# Patient Record
Sex: MALE | Race: WHITE | Employment: OTHER | URBAN - METROPOLITAN AREA
[De-identification: names, ages, dates, MRNs, and addresses within clinical notes are randomized per-mention and may not be internally consistent; named-entity substitution may affect disease eponyms.]

---

## 2024-03-19 ENCOUNTER — APPOINTMENT (OUTPATIENT)
Dept: GENERAL RADIOLOGY | Age: 69
End: 2024-03-19
Payer: MEDICARE

## 2024-03-19 ENCOUNTER — APPOINTMENT (OUTPATIENT)
Dept: CT IMAGING | Age: 69
End: 2024-03-19
Payer: MEDICARE

## 2024-03-19 ENCOUNTER — HOSPITAL ENCOUNTER (EMERGENCY)
Age: 69
Discharge: ANOTHER HOSPITAL | End: 2024-03-19
Attending: EMERGENCY MEDICINE
Payer: MEDICARE

## 2024-03-19 VITALS
TEMPERATURE: 97.8 F | OXYGEN SATURATION: 93 % | BODY MASS INDEX: 29.82 KG/M2 | WEIGHT: 225 LBS | SYSTOLIC BLOOD PRESSURE: 109 MMHG | HEART RATE: 119 BPM | HEIGHT: 73 IN | DIASTOLIC BLOOD PRESSURE: 88 MMHG | RESPIRATION RATE: 21 BRPM

## 2024-03-19 DIAGNOSIS — I26.02 ACUTE SADDLE PULMONARY EMBOLISM WITH ACUTE COR PULMONALE (HCC): ICD-10-CM

## 2024-03-19 DIAGNOSIS — T07.XXXA MULTIPLE ABRASIONS: ICD-10-CM

## 2024-03-19 DIAGNOSIS — S01.81XA FACIAL LACERATION, INITIAL ENCOUNTER: ICD-10-CM

## 2024-03-19 DIAGNOSIS — S12.301A CLOSED NONDISPLACED FRACTURE OF FOURTH CERVICAL VERTEBRA, UNSPECIFIED FRACTURE MORPHOLOGY, INITIAL ENCOUNTER (HCC): ICD-10-CM

## 2024-03-19 DIAGNOSIS — S02.2XXA CLOSED FRACTURE OF NASAL BONE, INITIAL ENCOUNTER: Primary | ICD-10-CM

## 2024-03-19 LAB
ALBUMIN SERPL-MCNC: 3.4 G/DL (ref 3.5–5.2)
ALP SERPL-CCNC: 115 U/L (ref 40–129)
ALT SERPL-CCNC: 54 U/L (ref 0–40)
ANION GAP SERPL CALCULATED.3IONS-SCNC: 14 MMOL/L (ref 7–16)
AST SERPL-CCNC: 41 U/L (ref 0–39)
BASOPHILS # BLD: 0.05 K/UL (ref 0–0.2)
BASOPHILS NFR BLD: 0 % (ref 0–2)
BILIRUB SERPL-MCNC: 1 MG/DL (ref 0–1.2)
BNP SERPL-MCNC: 105 PG/ML (ref 0–450)
BUN SERPL-MCNC: 22 MG/DL (ref 6–23)
CALCIUM SERPL-MCNC: 8.9 MG/DL (ref 8.6–10.2)
CHLORIDE SERPL-SCNC: 103 MMOL/L (ref 98–107)
CO2 SERPL-SCNC: 21 MMOL/L (ref 22–29)
CREAT SERPL-MCNC: 1.2 MG/DL (ref 0.7–1.2)
D DIMER: >5250 NG/ML DDU (ref 0–232)
EOSINOPHIL # BLD: 0.08 K/UL (ref 0.05–0.5)
EOSINOPHILS RELATIVE PERCENT: 1 % (ref 0–6)
ERYTHROCYTE [DISTWIDTH] IN BLOOD BY AUTOMATED COUNT: 13.3 % (ref 11.5–15)
GFR SERPL CREATININE-BSD FRML MDRD: >60 ML/MIN/1.73M2
GLUCOSE BLD-MCNC: 120 MG/DL (ref 74–99)
GLUCOSE SERPL-MCNC: 142 MG/DL (ref 74–99)
HCT VFR BLD AUTO: 41.6 % (ref 37–54)
HGB BLD-MCNC: 13.6 G/DL (ref 12.5–16.5)
IMM GRANULOCYTES # BLD AUTO: 0.07 K/UL (ref 0–0.58)
IMM GRANULOCYTES NFR BLD: 1 % (ref 0–5)
INFLUENZA A BY PCR: NOT DETECTED
INFLUENZA B BY PCR: NOT DETECTED
LYMPHOCYTES NFR BLD: 2.09 K/UL (ref 1.5–4)
LYMPHOCYTES RELATIVE PERCENT: 17 % (ref 20–42)
MCH RBC QN AUTO: 31.1 PG (ref 26–35)
MCHC RBC AUTO-ENTMCNC: 32.7 G/DL (ref 32–34.5)
MCV RBC AUTO: 95.2 FL (ref 80–99.9)
MONOCYTES NFR BLD: 1.34 K/UL (ref 0.1–0.95)
MONOCYTES NFR BLD: 11 % (ref 2–12)
NEUTROPHILS NFR BLD: 70 % (ref 43–80)
NEUTS SEG NFR BLD: 8.52 K/UL (ref 1.8–7.3)
PARTIAL THROMBOPLASTIN TIME: 30.7 SEC (ref 24.5–35.1)
PLATELET # BLD AUTO: 279 K/UL (ref 130–450)
PMV BLD AUTO: 9.2 FL (ref 7–12)
POTASSIUM SERPL-SCNC: 4.4 MMOL/L (ref 3.5–5)
PROT SERPL-MCNC: 7.4 G/DL (ref 6.4–8.3)
RBC # BLD AUTO: 4.37 M/UL (ref 3.8–5.8)
SARS-COV-2 RDRP RESP QL NAA+PROBE: NOT DETECTED
SODIUM SERPL-SCNC: 138 MMOL/L (ref 132–146)
SPECIMEN DESCRIPTION: NORMAL
TROPONIN I SERPL HS-MCNC: 157 NG/L (ref 0–11)
TROPONIN I SERPL HS-MCNC: 214 NG/L (ref 0–11)
TROPONIN I SERPL HS-MCNC: 42 NG/L (ref 0–11)
WBC OTHER # BLD: 12.2 K/UL (ref 4.5–11.5)

## 2024-03-19 PROCEDURE — 96365 THER/PROPH/DIAG IV INF INIT: CPT

## 2024-03-19 PROCEDURE — 87502 INFLUENZA DNA AMP PROBE: CPT

## 2024-03-19 PROCEDURE — 83880 ASSAY OF NATRIURETIC PEPTIDE: CPT

## 2024-03-19 PROCEDURE — 99285 EMERGENCY DEPT VISIT HI MDM: CPT

## 2024-03-19 PROCEDURE — 96366 THER/PROPH/DIAG IV INF ADDON: CPT

## 2024-03-19 PROCEDURE — 70450 CT HEAD/BRAIN W/O DYE: CPT

## 2024-03-19 PROCEDURE — 71275 CT ANGIOGRAPHY CHEST: CPT

## 2024-03-19 PROCEDURE — 84484 ASSAY OF TROPONIN QUANT: CPT

## 2024-03-19 PROCEDURE — 85379 FIBRIN DEGRADATION QUANT: CPT

## 2024-03-19 PROCEDURE — 96375 TX/PRO/DX INJ NEW DRUG ADDON: CPT

## 2024-03-19 PROCEDURE — 6360000004 HC RX CONTRAST MEDICATION: Performed by: RADIOLOGY

## 2024-03-19 PROCEDURE — 80053 COMPREHEN METABOLIC PANEL: CPT

## 2024-03-19 PROCEDURE — 85730 THROMBOPLASTIN TIME PARTIAL: CPT

## 2024-03-19 PROCEDURE — 71045 X-RAY EXAM CHEST 1 VIEW: CPT

## 2024-03-19 PROCEDURE — 2580000003 HC RX 258: Performed by: EMERGENCY MEDICINE

## 2024-03-19 PROCEDURE — 93005 ELECTROCARDIOGRAM TRACING: CPT | Performed by: EMERGENCY MEDICINE

## 2024-03-19 PROCEDURE — 96361 HYDRATE IV INFUSION ADD-ON: CPT

## 2024-03-19 PROCEDURE — 6360000002 HC RX W HCPCS: Performed by: EMERGENCY MEDICINE

## 2024-03-19 PROCEDURE — 12011 RPR F/E/E/N/L/M 2.5 CM/<: CPT

## 2024-03-19 PROCEDURE — 85025 COMPLETE CBC W/AUTO DIFF WBC: CPT

## 2024-03-19 PROCEDURE — 87635 SARS-COV-2 COVID-19 AMP PRB: CPT

## 2024-03-19 PROCEDURE — 70486 CT MAXILLOFACIAL W/O DYE: CPT

## 2024-03-19 PROCEDURE — 72125 CT NECK SPINE W/O DYE: CPT

## 2024-03-19 PROCEDURE — 82962 GLUCOSE BLOOD TEST: CPT

## 2024-03-19 RX ORDER — HEPARIN SODIUM 1000 [USP'U]/ML
80 INJECTION, SOLUTION INTRAVENOUS; SUBCUTANEOUS PRN
Status: DISCONTINUED | OUTPATIENT
Start: 2024-03-19 | End: 2024-03-19 | Stop reason: HOSPADM

## 2024-03-19 RX ORDER — 0.9 % SODIUM CHLORIDE 0.9 %
1000 INTRAVENOUS SOLUTION INTRAVENOUS ONCE
Status: COMPLETED | OUTPATIENT
Start: 2024-03-19 | End: 2024-03-19

## 2024-03-19 RX ORDER — HEPARIN SODIUM 10000 [USP'U]/100ML
5-30 INJECTION, SOLUTION INTRAVENOUS CONTINUOUS
Status: DISCONTINUED | OUTPATIENT
Start: 2024-03-19 | End: 2024-03-19 | Stop reason: HOSPADM

## 2024-03-19 RX ORDER — HEPARIN SODIUM 1000 [USP'U]/ML
80 INJECTION, SOLUTION INTRAVENOUS; SUBCUTANEOUS ONCE
Status: COMPLETED | OUTPATIENT
Start: 2024-03-19 | End: 2024-03-19

## 2024-03-19 RX ORDER — HEPARIN SODIUM 1000 [USP'U]/ML
40 INJECTION, SOLUTION INTRAVENOUS; SUBCUTANEOUS PRN
Status: DISCONTINUED | OUTPATIENT
Start: 2024-03-19 | End: 2024-03-19 | Stop reason: HOSPADM

## 2024-03-19 RX ADMIN — SODIUM CHLORIDE 1000 ML: 9 INJECTION, SOLUTION INTRAVENOUS at 11:34

## 2024-03-19 RX ADMIN — CEFAZOLIN 2000 MG: 2 INJECTION, POWDER, FOR SOLUTION INTRAMUSCULAR; INTRAVENOUS at 14:36

## 2024-03-19 RX ADMIN — HEPARIN SODIUM 8170 UNITS: 1000 INJECTION INTRAVENOUS; SUBCUTANEOUS at 13:42

## 2024-03-19 RX ADMIN — HEPARIN SODIUM AND DEXTROSE 18 UNITS/KG/HR: 10000; 5 INJECTION INTRAVENOUS at 13:45

## 2024-03-19 RX ADMIN — IOPAMIDOL 70 ML: 755 INJECTION, SOLUTION INTRAVENOUS at 12:38

## 2024-03-19 ASSESSMENT — ENCOUNTER SYMPTOMS
SHORTNESS OF BREATH: 0
EYE REDNESS: 0
VOMITING: 0
ABDOMINAL PAIN: 0
NAUSEA: 0

## 2024-03-19 NOTE — ED PROVIDER NOTES
[facility] EMERGENCY DEPARTMENT  EMERGENCY DEPARTMENT ENCOUNTER        Pt Name: Noel Guerrero  MRN: [id number]  Birthdate 1955  Date of evaluation: 3/19/2024  Provider: Angelique Biggs DO  PCP: No primary care provider on file.  Note Started: 11:03 AM EDT 3/19/24    CHIEF COMPLAINT       Chief Complaint   Patient presents with    Fall     Fell face first, +loc, lac on forehead and nose - thinners     Dizziness       HISTORY OF PRESENT ILLNESS: 1 or more Elements       Noel Guerrero is a 68 y.o. male who presents to the emergency department the chief complaint of fall.  The history is obtained from the patient as well as the patient's medical record.  The patient is presenting for a fall.  The patient states that over the last week he has had viral symptoms.  He has been treating these symptomatically with over-the-counter medications.  He states that he had improvement in his symptoms this morning when out to walk the dog.  He states he had sudden onset of lightheadedness causing him to fall and strike his face.  He is not on any blood thinners.  He states he is up-to-date on his tetanus immunization.  He does have pain with sleep located in his head.  He denies any numbness, weakness or paresthesias.  He denies any fevers, chills, chest pain shortness of breath, nausea or vomiting.    Nursing Notes were all reviewed and agreed with or any disagreements were addressed in the HPI.    REVIEW OF SYSTEMS :      Review of Systems   Constitutional:  Positive for fatigue. Negative for fever.   HENT:  Negative for congestion.    Eyes:  Negative for redness.   Respiratory:  Negative for shortness of breath.    Cardiovascular:  Negative for chest pain.   Gastrointestinal:  Negative for abdominal pain, nausea and vomiting.   Genitourinary:  Negative for dysuria.   Musculoskeletal:  Negative for arthralgias.   Skin:  Positive for wound. Negative for rash.   Neurological:  Positive for dizziness

## 2024-03-19 NOTE — CONSULTS
PULMONARY CRITICAL CARE CONSULTATION NOTE.    3/19/2024    CONSULTING  PHYSICIAN: Dr. Biggs    REASON FOR REFERRAL: Pulmonary embolism    History of Present Illness    Mr. Guerrero  is a 68 y.o. never smoker  male with no prior family history of blood clots.  He presented to [facility] emergency room after he sustained a fall and injured right side of his face including nose and forehead.  It was a syncopal episode and patient lost recollection of some of the activities starting just before the fall.  He gained consciousness while in to the hospital.  He was found to have a large saddle and bilateral pulmonary embolism with right heart strain.  CT scan of the chest was personally reviewed by me.  There is a possible right lower lobe pulmonary infarct as well.  Patient came back from [place] at the end of January and a 12-hour long flight.  He has been sick with flulike symptoms for the last week or so.  He has been tested twice for COVID over the last 10 days and has been negative both times.    Presenting symptoms-    Gradually worsening ARIAS and syncope. No acute symptoms of MI such as chest pain.     Baseline Exercise tolerance/MMRC score( Bold )     MMRC Dyspnea Scale  Grade Description of Breathlessness   0 I only get breathless with strenuous exercise.   1 I get short of breath when hurrying on level ground or walking up a slight hill.   2 On level ground, I walk slower than people of the same age because of breathlessness, or have to stop for breath when walking at my own pace.   3 I stop for breath after walking about 100 yards or after a few minutes on level ground.   4 I am too breathless to leave the house or I am breathless when dressing.     Smoking history- Never smoker    Occupational exposure/ Pet/bird -  No history of exposure to any occupational Pneumotoxins.     Age appropriate Cancer screening-   Patient is up to date on age appropriate cancer screening and immunizations.

## 2024-03-20 LAB
EKG ATRIAL RATE: 112 BPM
EKG ATRIAL RATE: 115 BPM
EKG P AXIS: 34 DEGREES
EKG P AXIS: 74 DEGREES
EKG P-R INTERVAL: 140 MS
EKG P-R INTERVAL: 144 MS
EKG Q-T INTERVAL: 324 MS
EKG Q-T INTERVAL: 362 MS
EKG QRS DURATION: 126 MS
EKG QRS DURATION: 92 MS
EKG QTC CALCULATION (BAZETT): 448 MS
EKG QTC CALCULATION (BAZETT): 494 MS
EKG R AXIS: 20 DEGREES
EKG R AXIS: 9 DEGREES
EKG T AXIS: -23 DEGREES
EKG T AXIS: -28 DEGREES
EKG VENTRICULAR RATE: 112 BPM
EKG VENTRICULAR RATE: 115 BPM

## 2024-03-20 PROCEDURE — 93010 ELECTROCARDIOGRAM REPORT: CPT | Performed by: INTERNAL MEDICINE

## 2024-03-27 ENCOUNTER — APPOINTMENT (OUTPATIENT)
Dept: GENERAL RADIOLOGY | Age: 69
End: 2024-03-27
Payer: MEDICARE

## 2024-03-27 ENCOUNTER — HOSPITAL ENCOUNTER (EMERGENCY)
Age: 69
Discharge: ANOTHER HOSPITAL | End: 2024-03-27
Attending: STUDENT IN AN ORGANIZED HEALTH CARE EDUCATION/TRAINING PROGRAM
Payer: MEDICARE

## 2024-03-27 ENCOUNTER — APPOINTMENT (OUTPATIENT)
Dept: CT IMAGING | Age: 69
End: 2024-03-27
Payer: MEDICARE

## 2024-03-27 VITALS
DIASTOLIC BLOOD PRESSURE: 79 MMHG | OXYGEN SATURATION: 99 % | WEIGHT: 224.87 LBS | TEMPERATURE: 98.1 F | BODY MASS INDEX: 29.67 KG/M2 | HEART RATE: 92 BPM | SYSTOLIC BLOOD PRESSURE: 108 MMHG | RESPIRATION RATE: 18 BRPM

## 2024-03-27 DIAGNOSIS — A41.9 SEPTIC SHOCK (HCC): Primary | ICD-10-CM

## 2024-03-27 DIAGNOSIS — R65.21 SEPTIC SHOCK (HCC): Primary | ICD-10-CM

## 2024-03-27 DIAGNOSIS — D72.829 LEUKOCYTOSIS, UNSPECIFIED TYPE: ICD-10-CM

## 2024-03-27 DIAGNOSIS — I26.92 ACUTE SADDLE PULMONARY EMBOLISM, UNSPECIFIED WHETHER ACUTE COR PULMONALE PRESENT (HCC): ICD-10-CM

## 2024-03-27 DIAGNOSIS — A41.9 SEPTICEMIA (HCC): ICD-10-CM

## 2024-03-27 DIAGNOSIS — E87.20 LACTIC ACIDOSIS: ICD-10-CM

## 2024-03-27 LAB
ALBUMIN SERPL-MCNC: 4.2 G/DL (ref 3.5–5.2)
ALP SERPL-CCNC: 163 U/L (ref 40–129)
ALT SERPL-CCNC: 104 U/L (ref 0–40)
ANION GAP SERPL CALCULATED.3IONS-SCNC: 25 MMOL/L (ref 7–16)
AST SERPL-CCNC: 54 U/L (ref 0–39)
B.E.: -17.3 MMOL/L (ref -3–3)
BACTERIA URNS QL MICRO: ABNORMAL
BASOPHILS # BLD: 0.07 K/UL (ref 0–0.2)
BASOPHILS NFR BLD: 0 % (ref 0–2)
BILIRUB SERPL-MCNC: 0.5 MG/DL (ref 0–1.2)
BILIRUB UR QL STRIP: ABNORMAL
BNP SERPL-MCNC: 352 PG/ML (ref 0–450)
BUN SERPL-MCNC: 39 MG/DL (ref 6–23)
CALCIUM SERPL-MCNC: 9.4 MG/DL (ref 8.6–10.2)
CASTS #/AREA URNS LPF: ABNORMAL /LPF
CASTS #/AREA URNS LPF: ABNORMAL /LPF
CHLORIDE SERPL-SCNC: 98 MMOL/L (ref 98–107)
CLARITY UR: ABNORMAL
CO2 SERPL-SCNC: 14 MMOL/L (ref 22–29)
COHB: 0.3 % (ref 0–1.5)
COLOR UR: ABNORMAL
CREAT SERPL-MCNC: 1.8 MG/DL (ref 0.7–1.2)
CRITICAL: ABNORMAL
DATE ANALYZED: ABNORMAL
DATE OF COLLECTION: ABNORMAL
EOSINOPHIL # BLD: 0.03 K/UL (ref 0.05–0.5)
EOSINOPHILS RELATIVE PERCENT: 0 % (ref 0–6)
ERYTHROCYTE [DISTWIDTH] IN BLOOD BY AUTOMATED COUNT: 13.3 % (ref 11.5–15)
GFR SERPL CREATININE-BSD FRML MDRD: 41 ML/MIN/1.73M2
GLUCOSE SERPL-MCNC: 292 MG/DL (ref 74–99)
GLUCOSE UR STRIP-MCNC: >=1000 MG/DL
HCO3: 7.6 MMOL/L (ref 22–26)
HCT VFR BLD AUTO: 44.3 % (ref 37–54)
HGB BLD-MCNC: 14.2 G/DL (ref 12.5–16.5)
HGB UR QL STRIP.AUTO: ABNORMAL
HHB: 1.1 % (ref 0–5)
IMM GRANULOCYTES # BLD AUTO: 0.4 K/UL (ref 0–0.58)
IMM GRANULOCYTES NFR BLD: 2 % (ref 0–5)
INFLUENZA A BY PCR: NOT DETECTED
INFLUENZA B BY PCR: NOT DETECTED
INR PPP: 1.1
KETONES UR STRIP-MCNC: 40 MG/DL
LAB: ABNORMAL
LACTATE BLDV-SCNC: 7.4 MMOL/L (ref 0.5–1.9)
LACTATE BLDV-SCNC: 8.8 MMOL/L (ref 0.5–1.9)
LEUKOCYTE ESTERASE UR QL STRIP: ABNORMAL
LIPASE SERPL-CCNC: 51 U/L (ref 13–60)
LYMPHOCYTES NFR BLD: 2.78 K/UL (ref 1.5–4)
LYMPHOCYTES RELATIVE PERCENT: 14 % (ref 20–42)
Lab: 350
MCH RBC QN AUTO: 31.1 PG (ref 26–35)
MCHC RBC AUTO-ENTMCNC: 32.1 G/DL (ref 32–34.5)
MCV RBC AUTO: 97.1 FL (ref 80–99.9)
METHB: 0.3 % (ref 0–1.5)
MODE: ABNORMAL
MONOCYTES NFR BLD: 0.84 K/UL (ref 0.1–0.95)
MONOCYTES NFR BLD: 4 % (ref 2–12)
NEUTROPHILS NFR BLD: 79 % (ref 43–80)
NEUTS SEG NFR BLD: 15.66 K/UL (ref 1.8–7.3)
NITRITE UR QL STRIP: POSITIVE
O2 CONTENT: 17.9 ML/DL
O2 SATURATION: 98.9 % (ref 92–98.5)
O2HB: 98.3 % (ref 94–97)
OPERATOR ID: ABNORMAL
PARTIAL THROMBOPLASTIN TIME: 25.4 SEC (ref 24.5–35.1)
PATIENT TEMP: 37 C
PCO2: 17.9 MMHG (ref 35–45)
PH BLOOD GAS: 7.25 (ref 7.35–7.45)
PH UR STRIP: 6 [PH] (ref 5–9)
PLATELET # BLD AUTO: 598 K/UL (ref 130–450)
PMV BLD AUTO: 8.8 FL (ref 7–12)
PO2: 145.2 MMHG (ref 75–100)
POTASSIUM SERPL-SCNC: 3.8 MMOL/L (ref 3.5–5)
PROT SERPL-MCNC: 7.8 G/DL (ref 6.4–8.3)
PROT UR STRIP-MCNC: 100 MG/DL
PROTHROMBIN TIME: 12.4 SEC (ref 9.3–12.4)
RBC # BLD AUTO: 4.56 M/UL (ref 3.8–5.8)
RBC #/AREA URNS HPF: ABNORMAL /HPF
SARS-COV-2 RDRP RESP QL NAA+PROBE: NOT DETECTED
SODIUM SERPL-SCNC: 137 MMOL/L (ref 132–146)
SOURCE, BLOOD GAS: ABNORMAL
SP GR UR STRIP: 1.02 (ref 1–1.03)
SPECIMEN DESCRIPTION: NORMAL
THB: 12.8 G/DL (ref 11.5–16.5)
TIME ANALYZED: 359
TROPONIN I SERPL HS-MCNC: 21 NG/L (ref 0–11)
TROPONIN I SERPL HS-MCNC: 31 NG/L (ref 0–11)
TROPONIN I SERPL HS-MCNC: 44 NG/L (ref 0–11)
UROBILINOGEN UR STRIP-ACNC: 0.2 EU/DL (ref 0–1)
WBC #/AREA URNS HPF: ABNORMAL /HPF
WBC OTHER # BLD: 19.8 K/UL (ref 4.5–11.5)

## 2024-03-27 PROCEDURE — 6360000002 HC RX W HCPCS: Performed by: STUDENT IN AN ORGANIZED HEALTH CARE EDUCATION/TRAINING PROGRAM

## 2024-03-27 PROCEDURE — 93005 ELECTROCARDIOGRAM TRACING: CPT | Performed by: STUDENT IN AN ORGANIZED HEALTH CARE EDUCATION/TRAINING PROGRAM

## 2024-03-27 PROCEDURE — 85025 COMPLETE CBC W/AUTO DIFF WBC: CPT

## 2024-03-27 PROCEDURE — 84484 ASSAY OF TROPONIN QUANT: CPT

## 2024-03-27 PROCEDURE — 71045 X-RAY EXAM CHEST 1 VIEW: CPT

## 2024-03-27 PROCEDURE — 83605 ASSAY OF LACTIC ACID: CPT

## 2024-03-27 PROCEDURE — 85730 THROMBOPLASTIN TIME PARTIAL: CPT

## 2024-03-27 PROCEDURE — 80053 COMPREHEN METABOLIC PANEL: CPT

## 2024-03-27 PROCEDURE — 2580000003 HC RX 258: Performed by: STUDENT IN AN ORGANIZED HEALTH CARE EDUCATION/TRAINING PROGRAM

## 2024-03-27 PROCEDURE — 82805 BLOOD GASES W/O2 SATURATION: CPT

## 2024-03-27 PROCEDURE — 99285 EMERGENCY DEPT VISIT HI MDM: CPT

## 2024-03-27 PROCEDURE — 83880 ASSAY OF NATRIURETIC PEPTIDE: CPT

## 2024-03-27 PROCEDURE — 96361 HYDRATE IV INFUSION ADD-ON: CPT

## 2024-03-27 PROCEDURE — 83690 ASSAY OF LIPASE: CPT

## 2024-03-27 PROCEDURE — 74174 CTA ABD&PLVS W/CONTRAST: CPT

## 2024-03-27 PROCEDURE — 87635 SARS-COV-2 COVID-19 AMP PRB: CPT

## 2024-03-27 PROCEDURE — 81001 URINALYSIS AUTO W/SCOPE: CPT

## 2024-03-27 PROCEDURE — 96366 THER/PROPH/DIAG IV INF ADDON: CPT

## 2024-03-27 PROCEDURE — 87502 INFLUENZA DNA AMP PROBE: CPT

## 2024-03-27 PROCEDURE — 87040 BLOOD CULTURE FOR BACTERIA: CPT

## 2024-03-27 PROCEDURE — 71275 CT ANGIOGRAPHY CHEST: CPT

## 2024-03-27 PROCEDURE — 96375 TX/PRO/DX INJ NEW DRUG ADDON: CPT

## 2024-03-27 PROCEDURE — 96365 THER/PROPH/DIAG IV INF INIT: CPT

## 2024-03-27 PROCEDURE — 36415 COLL VENOUS BLD VENIPUNCTURE: CPT

## 2024-03-27 PROCEDURE — 6360000004 HC RX CONTRAST MEDICATION: Performed by: RADIOLOGY

## 2024-03-27 PROCEDURE — 85610 PROTHROMBIN TIME: CPT

## 2024-03-27 RX ORDER — 0.9 % SODIUM CHLORIDE 0.9 %
1000 INTRAVENOUS SOLUTION INTRAVENOUS ONCE
Status: COMPLETED | OUTPATIENT
Start: 2024-03-27 | End: 2024-03-27

## 2024-03-27 RX ORDER — SODIUM CHLORIDE 9 MG/ML
INJECTION, SOLUTION INTRAVENOUS CONTINUOUS
Status: DISCONTINUED | OUTPATIENT
Start: 2024-03-27 | End: 2024-03-27 | Stop reason: HOSPADM

## 2024-03-27 RX ADMIN — PIPERACILLIN AND TAZOBACTAM 4500 MG: 4; .5 INJECTION, POWDER, FOR SOLUTION INTRAVENOUS at 04:37

## 2024-03-27 RX ADMIN — HYDROCORTISONE SODIUM SUCCINATE 100 MG: 100 INJECTION, POWDER, FOR SOLUTION INTRAMUSCULAR; INTRAVENOUS at 04:25

## 2024-03-27 RX ADMIN — SODIUM CHLORIDE: 9 INJECTION, SOLUTION INTRAVENOUS at 04:37

## 2024-03-27 RX ADMIN — IOPAMIDOL 80 ML: 755 INJECTION, SOLUTION INTRAVENOUS at 03:29

## 2024-03-27 RX ADMIN — VANCOMYCIN HYDROCHLORIDE 2000 MG: 10 INJECTION, POWDER, LYOPHILIZED, FOR SOLUTION INTRAVENOUS at 05:13

## 2024-03-27 RX ADMIN — SODIUM CHLORIDE 1000 ML: 9 INJECTION, SOLUTION INTRAVENOUS at 03:43

## 2024-03-27 RX ADMIN — SODIUM CHLORIDE 1000 ML: 9 INJECTION, SOLUTION INTRAVENOUS at 02:40

## 2024-03-27 ASSESSMENT — LIFESTYLE VARIABLES
HOW MANY STANDARD DRINKS CONTAINING ALCOHOL DO YOU HAVE ON A TYPICAL DAY: PATIENT DOES NOT DRINK
HOW OFTEN DO YOU HAVE A DRINK CONTAINING ALCOHOL: NEVER

## 2024-03-27 NOTE — DISCHARGE INSTR - COC
Continuity of Care Form    Patient Name: Noel Guerrero   :  1955  MRN:  [id number]    Admit date:  3/27/2024  Discharge date:  ***    Code Status Order: No Order   Advance Directives:     Admitting Physician:  No admitting provider for patient encounter.  PCP: Cosme Marquez DO    Discharging Nurse: ***  Discharging Hospital Unit/Room#: GABO/GABO  Discharging Unit Phone Number: ***    Emergency Contact:   Extended Emergency Contact Information  Primary Emergency Contact: MADY SANTOS  Mobile Phone: [phone]  Relation: Spouse   needed? No    Past Surgical History:  History reviewed. No pertinent surgical history.    Immunization History:   Immunization History   Administered Date(s) Administered    COVID-19, MODERNA, ( formula), (age 12y+), IM, 50mcg/0.5mL 2023       Active Problems:  There is no problem list on file for this patient.      Isolation/Infection:   Isolation            No Isolation          Patient Infection Status       None to display                     Nurse Assessment:  Last Vital Signs: /79   Pulse 92   Temp 98.1 °F (36.7 °C) (Bladder)   Resp 18   Wt 102 kg (224 lb 13.9 oz)   SpO2 99%   BMI 29.67 kg/m²     Last documented pain score (0-10 scale):    Last Weight:   Wt Readings from Last 1 Encounters:   24 102 kg (224 lb 13.9 oz)     Mental Status:  {IP PT MENTAL STATUS:}    IV Access:  { NIDIA IV ACCESS:804856850}    Nursing Mobility/ADLs:  Walking   {CHP DME ADLs:219366807}  Transfer  {CHP DME ADLs:178527754}  Bathing  {CHP DME ADLs:426620872}  Dressing  {CHP DME ADLs:240126926}  Toileting  {CHP DME ADLs:604898353}  Feeding  {CHP DME ADLs:099983786}  Med Admin  {CHP DME ADLs:708445354}  Med Delivery   { NIDIA MED Delivery:812203714}    Wound Care Documentation and Therapy:        Elimination:  Continence:   Bowel: {YES / NO:}  Bladder: {YES / NO:}  Urinary Catheter: {Urinary Catheter:314847475}   Colostomy/Ileostomy/Ileal Conduit:  None

## 2024-03-27 NOTE — ED PROVIDER NOTES
he is wearing a Aspen collar.  Respiratory: Lungs clear to auscultation bilaterally, no wheezes, rales, or rhonchi. Not in respiratory distress  Cardiovascular:  Regular rate. Regular rhythm. No murmurs, .   Chest: No chest wall tenderness  GI:  Abdomen Soft, Non tender, Non distended.     No rebound, guarding, or rigidity. No pulsatile masses.  Musculoskeletal: Moves all extremities x 4. Warm and well perfused, no clubbing, cyanosis, or edema. Capillary refill <3 seconds, no midline tenderness noted to the lower spine no signs of ecchymosis or erythema to the lower back.  Integument: skin warm and dry. No rashes.   Neurologic: GCS 15, no focal deficits,   Psychiatric: Normal Affect          -------------------------------------------------- RESULTS -------------------------------------------------  I have personally reviewed all laboratory and imaging results for this patient. Results are listed below.       ------------------------------ ED COURSE/MEDICAL DECISION MAKING----------------------  Medications   sodium chloride 0.9 % bolus 1,000 mL (0 mLs IntraVENous Stopped 3/27/24 0343)   iopamidol (ISOVUE-370) 76 % injection 80 mL (80 mLs IntraVENous Given 3/27/24 3319)   sodium chloride 0.9 % bolus 1,000 mL (0 mLs IntraVENous Stopped 3/27/24 0344)   hydrocortisone sodium succinate PF (SOLU-CORTEF) injection 100 mg (100 mg IntraVENous Given 3/27/24 3145)   vancomycin (VANCOCIN) 2000 mg in 0.9% sodium chloride 500 mL IVPB (0 mg IntraVENous Stopped 3/27/24 2740)   piperacillin-tazobactam (ZOSYN) 4,500 mg in sodium chloride 0.9 % 100 mL IVPB ([id number]) (0 mg IntraVENous Stopped 3/27/24 0512)            Medical Decision Making:     Diagnostic results    LABS:    Labs Reviewed   LACTATE, SEPSIS - Abnormal; Notable for the following components:       Result Value    Lactic Acid, Sepsis 8.8 (*)     All other components within normal limits   LACTATE, SEPSIS - Abnormal; Notable for the following components:    Lactic Acid,  stable intervals no acute ST elevation or depression however difficult baseline to interpret due to baseline artifact.  Due to patient's shivering.  Stable as compared to previous [CB]   0406 Initial wet read as interpreted by myself chest X-ray: No pneumothorax, trachea midline, no acute infiltrate  See radiologist for final interpretation   [CB]   0611 Dr james joiner with transfer to [facility] [CB]      ED Course User Index  [CB] Alcon Lujan MD       Is this patient to be included in the SEP-1 core measure? Yes SEP-1 CORE MEASURE DATA      Sepsis Criteria   Severe Sepsis Criteria   Septic Shock Criteria       Must meet 2:    []Temp >100.9 F (38.3 C) or < 96.8 F (36 C)  [x]HR > 90  [x]RR > 20  [x]WBC > 12 or < 4 or 10% bands    AND:    [x] Infection Confirmed or Suspected.     Must meet 1:    [x]Lactate > 2       or   [x]Signs of Organ Dysfunction:    - SBP < 90 or MAP < 65  -Creatinine > 2 or increased from baseline  -Urine Output < 0.5 ml/kg/hr  -Bilirubin > 2  -INR > 1.5 (not anticoagulated)  -Platelets < 100,000  -Acute Respiratory Failure as evidenced by new need for NIPPV or mechanical ventilation   Must meet 1:    [x]Lactate > 4        or   [x]SBP < 90 or MAP < 65 for at least two readings in the first hour after fluid bolus administration    []Vasopressors initiated (if hypotension persists after fluid resuscitation)   No data found.   Recent Labs     03/27/24  0238   WBC 19.8*   CREATININE 1.8*   BILITOT 0.5   INR 1.1   *        Septic shock    Fluid Resuscitation Rationale: at least 30mL/kg based on entered actual weight at time of triage    Repeat lactate level: improving    Reassessment Exam: I have reassessed tissue perfusion and hemodynamic status after fluid bolus at this date/time:      This patient's ED course included: a personal history and physicial examination, re-evaluation prior to disposition, multiple bedside re-evaluations, IV medications, cardiac monitoring, continuous

## 2024-03-27 NOTE — ED NOTES
Irrigated cath with 60cc of sterile saline. Pierre blood returned and no clots. Dr. Lujan notified

## 2024-03-27 NOTE — ED NOTES
Radiology Procedure Waiver   Name: Noel Guerrero  : 1955  MRN: [id number]    Date:  3/27/24    Time: 2:42 AM EDT    Benefits of immediately proceeding with radiology exam(s) without pre-testing outweigh the risks or are not indicated as specified below and therefore the following is/are being waived:    [x] Benefits of immediate radiology exam(s) outweigh any risk.                                               OR    Pre-exam testing is not indicated for the following reason(s):  [] Pregnancy test   [] Patients LMP on-time and regular.   [] Patient had Tubal Ligation or has other Contraception Device.   [] Patient  is Menopausal or Premenarcheal.    [] Patient had Full or Partial Hysterectomy.    [] Protocol for CT contrast allegry   [] Patient has tolerated well previously   [] Patient does not have a true allergy    [] MRI Questionnaire     [] BUN/Creatinine   [] Patient age w/no hx of renal dysfunction.   [] Patient on Dialysis.   [] Recent Normal Labs.  Electronically signed by Alcon Lujan MD on 3/27/24 at 2:42 AM DIANET               Alcon Lujan MD  [phone]

## 2024-03-28 LAB
EKG ATRIAL RATE: 123 BPM
EKG P AXIS: 85 DEGREES
EKG P-R INTERVAL: 134 MS
EKG Q-T INTERVAL: 344 MS
EKG QRS DURATION: 82 MS
EKG QTC CALCULATION (BAZETT): 492 MS
EKG R AXIS: 55 DEGREES
EKG T AXIS: 60 DEGREES
EKG VENTRICULAR RATE: 123 BPM

## 2024-03-28 PROCEDURE — 93010 ELECTROCARDIOGRAM REPORT: CPT | Performed by: INTERNAL MEDICINE

## 2024-03-31 LAB
MICROORGANISM SPEC CULT: NORMAL
MICROORGANISM SPEC CULT: NORMAL
SERVICE CMNT-IMP: NORMAL
SERVICE CMNT-IMP: NORMAL
SPECIMEN DESCRIPTION: NORMAL
SPECIMEN DESCRIPTION: NORMAL

## 2024-04-15 PROCEDURE — 99285 EMERGENCY DEPT VISIT HI MDM: CPT

## 2024-04-16 ENCOUNTER — HOSPITAL ENCOUNTER (INPATIENT)
Age: 69
LOS: 4 days | Discharge: NURSING, REHAB OR LONG-TERM CARE | DRG: 286 | End: 2024-04-23
Attending: EMERGENCY MEDICINE | Admitting: STUDENT IN AN ORGANIZED HEALTH CARE EDUCATION/TRAINING PROGRAM
Payer: MEDICARE

## 2024-04-16 ENCOUNTER — APPOINTMENT (OUTPATIENT)
Dept: NUCLEAR MEDICINE | Age: 69
DRG: 286 | End: 2024-04-16
Attending: NURSE PRACTITIONER
Payer: MEDICARE

## 2024-04-16 ENCOUNTER — APPOINTMENT (OUTPATIENT)
Dept: ULTRASOUND IMAGING | Age: 69
DRG: 286 | End: 2024-04-16
Payer: MEDICARE

## 2024-04-16 ENCOUNTER — APPOINTMENT (OUTPATIENT)
Dept: GENERAL RADIOLOGY | Age: 69
DRG: 286 | End: 2024-04-16
Payer: MEDICARE

## 2024-04-16 ENCOUNTER — APPOINTMENT (OUTPATIENT)
Age: 69
DRG: 286 | End: 2024-04-16
Attending: NURSE PRACTITIONER
Payer: MEDICARE

## 2024-04-16 ENCOUNTER — APPOINTMENT (OUTPATIENT)
Dept: CT IMAGING | Age: 69
DRG: 286 | End: 2024-04-16
Payer: MEDICARE

## 2024-04-16 DIAGNOSIS — R60.0 LEG EDEMA: ICD-10-CM

## 2024-04-16 DIAGNOSIS — I21.4 NSTEMI (NON-ST ELEVATED MYOCARDIAL INFARCTION) (HCC): ICD-10-CM

## 2024-04-16 DIAGNOSIS — R07.9 CHEST PAIN, UNSPECIFIED TYPE: Primary | ICD-10-CM

## 2024-04-16 PROBLEM — I82.409 DVT (DEEP VENOUS THROMBOSIS) (HCC): Status: ACTIVE | Noted: 2024-04-16

## 2024-04-16 PROBLEM — Z86.711 HISTORY OF PULMONARY EMBOLISM: Status: ACTIVE | Noted: 2024-04-16

## 2024-04-16 PROBLEM — S12.300A C4 CERVICAL FRACTURE (HCC): Status: ACTIVE | Noted: 2024-03-26

## 2024-04-16 PROBLEM — I61.9 ICH (INTRACEREBRAL HEMORRHAGE) (HCC): Status: ACTIVE | Noted: 2024-04-16

## 2024-04-16 LAB
ALBUMIN SERPL-MCNC: 3.5 G/DL (ref 3.5–5.2)
ALP SERPL-CCNC: 295 U/L (ref 40–129)
ALT SERPL-CCNC: 139 U/L (ref 0–40)
ANION GAP SERPL CALCULATED.3IONS-SCNC: 12 MMOL/L (ref 7–16)
AST SERPL-CCNC: 180 U/L (ref 0–39)
BASOPHILS # BLD: 0.04 K/UL (ref 0–0.2)
BASOPHILS NFR BLD: 0 % (ref 0–2)
BILIRUB SERPL-MCNC: 0.9 MG/DL (ref 0–1.2)
BNP SERPL-MCNC: 107 PG/ML (ref 0–125)
BUN SERPL-MCNC: 17 MG/DL (ref 6–23)
CALCIUM SERPL-MCNC: 9.1 MG/DL (ref 8.6–10.2)
CHLORIDE SERPL-SCNC: 102 MMOL/L (ref 98–107)
CHOLEST SERPL-MCNC: 181 MG/DL
CO2 SERPL-SCNC: 24 MMOL/L (ref 22–29)
CREAT SERPL-MCNC: 0.9 MG/DL (ref 0.7–1.2)
ECHO BSA: 2.27 M2
EKG ATRIAL RATE: 98 BPM
EKG P AXIS: 31 DEGREES
EKG P-R INTERVAL: 142 MS
EKG Q-T INTERVAL: 358 MS
EKG QRS DURATION: 88 MS
EKG QTC CALCULATION (BAZETT): 457 MS
EKG R AXIS: 3 DEGREES
EKG T AXIS: -6 DEGREES
EKG VENTRICULAR RATE: 98 BPM
EOSINOPHIL # BLD: 0.11 K/UL (ref 0.05–0.5)
EOSINOPHILS RELATIVE PERCENT: 1 % (ref 0–6)
ERYTHROCYTE [DISTWIDTH] IN BLOOD BY AUTOMATED COUNT: 13.6 % (ref 11.5–15)
GFR SERPL CREATININE-BSD FRML MDRD: >90 ML/MIN/1.73M2
GLUCOSE SERPL-MCNC: 126 MG/DL (ref 74–99)
HBA1C MFR BLD: 5 % (ref 4–5.6)
HCT VFR BLD AUTO: 33.2 % (ref 37–54)
HDLC SERPL-MCNC: 34 MG/DL
HGB BLD-MCNC: 10.5 G/DL (ref 12.5–16.5)
IMM GRANULOCYTES # BLD AUTO: 0.07 K/UL (ref 0–0.58)
IMM GRANULOCYTES NFR BLD: 1 % (ref 0–5)
INR PPP: 1.2
LDLC SERPL CALC-MCNC: 121 MG/DL
LYMPHOCYTES NFR BLD: 1.2 K/UL (ref 1.5–4)
LYMPHOCYTES RELATIVE PERCENT: 11 % (ref 20–42)
MAGNESIUM SERPL-MCNC: 2.2 MG/DL (ref 1.6–2.6)
MAGNESIUM SERPL-MCNC: 2.2 MG/DL (ref 1.6–2.6)
MCH RBC QN AUTO: 30 PG (ref 26–35)
MCHC RBC AUTO-ENTMCNC: 31.6 G/DL (ref 32–34.5)
MCV RBC AUTO: 94.9 FL (ref 80–99.9)
MONOCYTES NFR BLD: 0.73 K/UL (ref 0.1–0.95)
MONOCYTES NFR BLD: 7 % (ref 2–12)
NEUTROPHILS NFR BLD: 80 % (ref 43–80)
NEUTS SEG NFR BLD: 8.73 K/UL (ref 1.8–7.3)
NUC STRESS EJECTION FRACTION: 75 %
PHOSPHATE SERPL-MCNC: 3.5 MG/DL (ref 2.5–4.5)
PLATELET # BLD AUTO: 464 K/UL (ref 130–450)
PMV BLD AUTO: 8.5 FL (ref 7–12)
POTASSIUM SERPL-SCNC: 4 MMOL/L (ref 3.5–5)
PROT SERPL-MCNC: 6.7 G/DL (ref 6.4–8.3)
PROTHROMBIN TIME: 13.3 SEC (ref 9.3–12.4)
RBC # BLD AUTO: 3.5 M/UL (ref 3.8–5.8)
SODIUM SERPL-SCNC: 138 MMOL/L (ref 132–146)
STRESS BASELINE DIAS BP: 70 MMHG
STRESS BASELINE HR: 72 BPM
STRESS BASELINE SYS BP: 110 MMHG
STRESS ESTIMATED WORKLOAD: 1 METS
STRESS PEAK DIAS BP: 70 MMHG
STRESS PEAK SYS BP: 110 MMHG
STRESS PERCENT HR ACHIEVED: 63 %
STRESS POST PEAK HR: 96 BPM
STRESS RATE PRESSURE PRODUCT: NORMAL BPM*MMHG
STRESS TARGET HR: 152 BPM
TID: 0.96
TRIGL SERPL-MCNC: 131 MG/DL
TROPONIN I SERPL HS-MCNC: 15 NG/L (ref 0–11)
TROPONIN I SERPL HS-MCNC: 16 NG/L (ref 0–11)
TSH SERPL DL<=0.05 MIU/L-ACNC: 1.3 UIU/ML (ref 0.27–4.2)
VLDLC SERPL CALC-MCNC: 26 MG/DL
WBC OTHER # BLD: 10.9 K/UL (ref 4.5–11.5)

## 2024-04-16 PROCEDURE — G0378 HOSPITAL OBSERVATION PER HR: HCPCS

## 2024-04-16 PROCEDURE — 93970 EXTREMITY STUDY: CPT

## 2024-04-16 PROCEDURE — 6360000002 HC RX W HCPCS: Performed by: NURSE PRACTITIONER

## 2024-04-16 PROCEDURE — 84484 ASSAY OF TROPONIN QUANT: CPT

## 2024-04-16 PROCEDURE — 99223 1ST HOSP IP/OBS HIGH 75: CPT | Performed by: INTERNAL MEDICINE

## 2024-04-16 PROCEDURE — 80053 COMPREHEN METABOLIC PANEL: CPT

## 2024-04-16 PROCEDURE — 2580000003 HC RX 258: Performed by: INTERNAL MEDICINE

## 2024-04-16 PROCEDURE — 93017 CV STRESS TEST TRACING ONLY: CPT

## 2024-04-16 PROCEDURE — 84443 ASSAY THYROID STIM HORMONE: CPT

## 2024-04-16 PROCEDURE — 6370000000 HC RX 637 (ALT 250 FOR IP): Performed by: FAMILY MEDICINE

## 2024-04-16 PROCEDURE — 71275 CT ANGIOGRAPHY CHEST: CPT

## 2024-04-16 PROCEDURE — APPSS180 APP SPLIT SHARED TIME > 60 MINUTES: Performed by: NURSE PRACTITIONER

## 2024-04-16 PROCEDURE — 6370000000 HC RX 637 (ALT 250 FOR IP): Performed by: INTERNAL MEDICINE

## 2024-04-16 PROCEDURE — 93010 ELECTROCARDIOGRAM REPORT: CPT | Performed by: INTERNAL MEDICINE

## 2024-04-16 PROCEDURE — 85025 COMPLETE CBC W/AUTO DIFF WBC: CPT

## 2024-04-16 PROCEDURE — A9500 TC99M SESTAMIBI: HCPCS | Performed by: RADIOLOGY

## 2024-04-16 PROCEDURE — 3430000000 HC RX DIAGNOSTIC RADIOPHARMACEUTICAL: Performed by: RADIOLOGY

## 2024-04-16 PROCEDURE — 93018 CV STRESS TEST I&R ONLY: CPT | Performed by: INTERNAL MEDICINE

## 2024-04-16 PROCEDURE — 93016 CV STRESS TEST SUPVJ ONLY: CPT | Performed by: INTERNAL MEDICINE

## 2024-04-16 PROCEDURE — 85610 PROTHROMBIN TIME: CPT

## 2024-04-16 PROCEDURE — 80061 LIPID PANEL: CPT

## 2024-04-16 PROCEDURE — 84100 ASSAY OF PHOSPHORUS: CPT

## 2024-04-16 PROCEDURE — 71045 X-RAY EXAM CHEST 1 VIEW: CPT

## 2024-04-16 PROCEDURE — 83880 ASSAY OF NATRIURETIC PEPTIDE: CPT

## 2024-04-16 PROCEDURE — 6360000004 HC RX CONTRAST MEDICATION: Performed by: RADIOLOGY

## 2024-04-16 PROCEDURE — 70450 CT HEAD/BRAIN W/O DYE: CPT

## 2024-04-16 PROCEDURE — 78452 HT MUSCLE IMAGE SPECT MULT: CPT

## 2024-04-16 PROCEDURE — 76705 ECHO EXAM OF ABDOMEN: CPT

## 2024-04-16 PROCEDURE — 93005 ELECTROCARDIOGRAM TRACING: CPT

## 2024-04-16 PROCEDURE — 78452 HT MUSCLE IMAGE SPECT MULT: CPT | Performed by: INTERNAL MEDICINE

## 2024-04-16 PROCEDURE — 36415 COLL VENOUS BLD VENIPUNCTURE: CPT

## 2024-04-16 PROCEDURE — 83036 HEMOGLOBIN GLYCOSYLATED A1C: CPT

## 2024-04-16 PROCEDURE — 83735 ASSAY OF MAGNESIUM: CPT

## 2024-04-16 RX ORDER — HYDRALAZINE HYDROCHLORIDE 20 MG/ML
10 INJECTION INTRAMUSCULAR; INTRAVENOUS EVERY 6 HOURS PRN
Status: DISCONTINUED | OUTPATIENT
Start: 2024-04-16 | End: 2024-04-23 | Stop reason: HOSPADM

## 2024-04-16 RX ORDER — SODIUM CHLORIDE 9 MG/ML
INJECTION, SOLUTION INTRAVENOUS PRN
Status: DISCONTINUED | OUTPATIENT
Start: 2024-04-16 | End: 2024-04-23 | Stop reason: HOSPADM

## 2024-04-16 RX ORDER — MIRTAZAPINE 15 MG/1
15 TABLET, FILM COATED ORAL NIGHTLY
Status: DISCONTINUED | OUTPATIENT
Start: 2024-04-16 | End: 2024-04-23 | Stop reason: HOSPADM

## 2024-04-16 RX ORDER — SODIUM CHLORIDE 0.9 % (FLUSH) 0.9 %
5-40 SYRINGE (ML) INJECTION EVERY 12 HOURS SCHEDULED
Status: DISCONTINUED | OUTPATIENT
Start: 2024-04-16 | End: 2024-04-23 | Stop reason: HOSPADM

## 2024-04-16 RX ORDER — TAMSULOSIN HYDROCHLORIDE 0.4 MG/1
0.4 CAPSULE ORAL DAILY
Status: DISCONTINUED | OUTPATIENT
Start: 2024-04-16 | End: 2024-04-16

## 2024-04-16 RX ORDER — MAGNESIUM SULFATE IN WATER 40 MG/ML
2000 INJECTION, SOLUTION INTRAVENOUS PRN
Status: DISCONTINUED | OUTPATIENT
Start: 2024-04-16 | End: 2024-04-23 | Stop reason: HOSPADM

## 2024-04-16 RX ORDER — GABAPENTIN 300 MG/1
300 CAPSULE ORAL 3 TIMES DAILY
Status: DISCONTINUED | OUTPATIENT
Start: 2024-04-16 | End: 2024-04-23 | Stop reason: HOSPADM

## 2024-04-16 RX ORDER — ACETAMINOPHEN 650 MG/1
650 SUPPOSITORY RECTAL EVERY 6 HOURS PRN
Status: DISCONTINUED | OUTPATIENT
Start: 2024-04-16 | End: 2024-04-23 | Stop reason: HOSPADM

## 2024-04-16 RX ORDER — TETRAKIS(2-METHOXYISOBUTYLISOCYANIDE)COPPER(I) TETRAFLUOROBORATE 1 MG/ML
30 INJECTION, POWDER, LYOPHILIZED, FOR SOLUTION INTRAVENOUS
Status: COMPLETED | OUTPATIENT
Start: 2024-04-16 | End: 2024-04-16

## 2024-04-16 RX ORDER — PANTOPRAZOLE SODIUM 40 MG/1
40 TABLET, DELAYED RELEASE ORAL
Status: DISCONTINUED | OUTPATIENT
Start: 2024-04-16 | End: 2024-04-23 | Stop reason: HOSPADM

## 2024-04-16 RX ORDER — QUETIAPINE FUMARATE 25 MG/1
12.5 TABLET, FILM COATED ORAL NIGHTLY
Status: DISCONTINUED | OUTPATIENT
Start: 2024-04-16 | End: 2024-04-23 | Stop reason: HOSPADM

## 2024-04-16 RX ORDER — POTASSIUM CHLORIDE 7.45 MG/ML
10 INJECTION INTRAVENOUS PRN
Status: DISCONTINUED | OUTPATIENT
Start: 2024-04-16 | End: 2024-04-23 | Stop reason: HOSPADM

## 2024-04-16 RX ORDER — TAMSULOSIN HYDROCHLORIDE 0.4 MG/1
0.4 CAPSULE ORAL DAILY
Status: DISCONTINUED | OUTPATIENT
Start: 2024-04-17 | End: 2024-04-23 | Stop reason: HOSPADM

## 2024-04-16 RX ORDER — ACETAMINOPHEN 500 MG
1000 TABLET ORAL EVERY 8 HOURS PRN
COMMUNITY
Start: 2024-03-25

## 2024-04-16 RX ORDER — LANOLIN ALCOHOL/MO/W.PET/CERES
3 CREAM (GRAM) TOPICAL NIGHTLY PRN
Status: DISCONTINUED | OUTPATIENT
Start: 2024-04-16 | End: 2024-04-23 | Stop reason: HOSPADM

## 2024-04-16 RX ORDER — FUROSEMIDE 20 MG/1
20 TABLET ORAL DAILY
Status: DISCONTINUED | OUTPATIENT
Start: 2024-04-16 | End: 2024-04-23 | Stop reason: HOSPADM

## 2024-04-16 RX ORDER — TETRAKIS(2-METHOXYISOBUTYLISOCYANIDE)COPPER(I) TETRAFLUOROBORATE 1 MG/ML
10 INJECTION, POWDER, LYOPHILIZED, FOR SOLUTION INTRAVENOUS
Status: COMPLETED | OUTPATIENT
Start: 2024-04-16 | End: 2024-04-16

## 2024-04-16 RX ORDER — VITAMIN B COMPLEX
1000 TABLET ORAL DAILY
Status: DISCONTINUED | OUTPATIENT
Start: 2024-04-16 | End: 2024-04-23 | Stop reason: HOSPADM

## 2024-04-16 RX ORDER — AMOXICILLIN 250 MG
1 CAPSULE ORAL DAILY
COMMUNITY
Start: 2024-03-25

## 2024-04-16 RX ORDER — ONDANSETRON 4 MG/1
4 TABLET, ORALLY DISINTEGRATING ORAL EVERY 8 HOURS PRN
Status: DISCONTINUED | OUTPATIENT
Start: 2024-04-16 | End: 2024-04-23 | Stop reason: HOSPADM

## 2024-04-16 RX ORDER — GABAPENTIN 300 MG/1
300 CAPSULE ORAL 3 TIMES DAILY
COMMUNITY
Start: 2024-02-02

## 2024-04-16 RX ORDER — MIRTAZAPINE 15 MG/1
15 TABLET, FILM COATED ORAL NIGHTLY
COMMUNITY
Start: 2024-04-06 | End: 2024-05-06

## 2024-04-16 RX ORDER — ONDANSETRON 2 MG/ML
4 INJECTION INTRAMUSCULAR; INTRAVENOUS EVERY 6 HOURS PRN
Status: DISCONTINUED | OUTPATIENT
Start: 2024-04-16 | End: 2024-04-23 | Stop reason: HOSPADM

## 2024-04-16 RX ORDER — POLYETHYLENE GLYCOL 3350 17 G/17G
17 POWDER, FOR SOLUTION ORAL DAILY PRN
Status: DISCONTINUED | OUTPATIENT
Start: 2024-04-16 | End: 2024-04-23 | Stop reason: HOSPADM

## 2024-04-16 RX ORDER — SODIUM CHLORIDE 0.9 % (FLUSH) 0.9 %
5-40 SYRINGE (ML) INJECTION PRN
Status: DISCONTINUED | OUTPATIENT
Start: 2024-04-16 | End: 2024-04-23 | Stop reason: HOSPADM

## 2024-04-16 RX ORDER — BENZONATATE 100 MG/1
100 CAPSULE ORAL 3 TIMES DAILY PRN
Status: DISCONTINUED | OUTPATIENT
Start: 2024-04-16 | End: 2024-04-23 | Stop reason: HOSPADM

## 2024-04-16 RX ORDER — TAMSULOSIN HYDROCHLORIDE 0.4 MG/1
0.4 CAPSULE ORAL DAILY
COMMUNITY
Start: 2024-04-07

## 2024-04-16 RX ORDER — PANTOPRAZOLE SODIUM 40 MG/1
40 TABLET, DELAYED RELEASE ORAL EVERY MORNING
COMMUNITY
Start: 2024-03-25

## 2024-04-16 RX ORDER — TAMSULOSIN HYDROCHLORIDE 0.4 MG/1
0.4 CAPSULE ORAL DAILY
Status: DISCONTINUED | OUTPATIENT
Start: 2024-04-16 | End: 2024-04-16 | Stop reason: SDUPTHER

## 2024-04-16 RX ORDER — SENNA AND DOCUSATE SODIUM 50; 8.6 MG/1; MG/1
1 TABLET, FILM COATED ORAL DAILY
Status: DISCONTINUED | OUTPATIENT
Start: 2024-04-16 | End: 2024-04-23 | Stop reason: HOSPADM

## 2024-04-16 RX ORDER — POTASSIUM CHLORIDE 20 MEQ/1
40 TABLET, EXTENDED RELEASE ORAL PRN
Status: DISCONTINUED | OUTPATIENT
Start: 2024-04-16 | End: 2024-04-23 | Stop reason: HOSPADM

## 2024-04-16 RX ORDER — ACETAMINOPHEN 325 MG/1
650 TABLET ORAL EVERY 6 HOURS PRN
Status: DISCONTINUED | OUTPATIENT
Start: 2024-04-16 | End: 2024-04-23 | Stop reason: HOSPADM

## 2024-04-16 RX ORDER — REGADENOSON 0.08 MG/ML
0.4 INJECTION, SOLUTION INTRAVENOUS
Status: COMPLETED | OUTPATIENT
Start: 2024-04-16 | End: 2024-04-16

## 2024-04-16 RX ADMIN — Medication 10 MILLICURIE: at 13:46

## 2024-04-16 RX ADMIN — SODIUM CHLORIDE, PRESERVATIVE FREE 10 ML: 5 INJECTION INTRAVENOUS at 21:44

## 2024-04-16 RX ADMIN — REGADENOSON 0.4 MG: 0.08 INJECTION, SOLUTION INTRAVENOUS at 14:23

## 2024-04-16 RX ADMIN — IOPAMIDOL 75 ML: 755 INJECTION, SOLUTION INTRAVENOUS at 03:25

## 2024-04-16 RX ADMIN — SENNOSIDES AND DOCUSATE SODIUM 1 TABLET: 50; 8.6 TABLET ORAL at 18:33

## 2024-04-16 RX ADMIN — QUETIAPINE FUMARATE 12.5 MG: 25 TABLET ORAL at 21:43

## 2024-04-16 RX ADMIN — MIRTAZAPINE 15 MG: 15 TABLET, FILM COATED ORAL at 21:43

## 2024-04-16 RX ADMIN — SODIUM CHLORIDE, PRESERVATIVE FREE 10 ML: 5 INJECTION INTRAVENOUS at 07:44

## 2024-04-16 RX ADMIN — Medication 30 MILLICURIE: at 14:24

## 2024-04-16 RX ADMIN — FUROSEMIDE 20 MG: 20 TABLET ORAL at 07:42

## 2024-04-16 RX ADMIN — ACETAMINOPHEN 650 MG: 325 TABLET ORAL at 07:43

## 2024-04-16 RX ADMIN — Medication 1000 UNITS: at 18:33

## 2024-04-16 RX ADMIN — GABAPENTIN 300 MG: 300 CAPSULE ORAL at 07:43

## 2024-04-16 RX ADMIN — PANTOPRAZOLE SODIUM 40 MG: 40 TABLET, DELAYED RELEASE ORAL at 07:43

## 2024-04-16 RX ADMIN — GABAPENTIN 300 MG: 300 CAPSULE ORAL at 21:44

## 2024-04-16 RX ADMIN — TAMSULOSIN HYDROCHLORIDE 0.4 MG: 0.4 CAPSULE ORAL at 07:42

## 2024-04-16 ASSESSMENT — PAIN DESCRIPTION - DESCRIPTORS: DESCRIPTORS: DISCOMFORT

## 2024-04-16 ASSESSMENT — PAIN DESCRIPTION - LOCATION: LOCATION: CHEST

## 2024-04-16 ASSESSMENT — PAIN DESCRIPTION - ORIENTATION: ORIENTATION: MID

## 2024-04-16 ASSESSMENT — PAIN - FUNCTIONAL ASSESSMENT: PAIN_FUNCTIONAL_ASSESSMENT: 0-10

## 2024-04-16 ASSESSMENT — PAIN SCALES - GENERAL: PAINLEVEL_OUTOF10: 2

## 2024-04-16 NOTE — CARE COORDINATION
SOCIAL WORK/TRANSITION OF CARE     Patient presents to the ED secondary to chest pain from [facility]. Patient is admitted observation with chest pain. Patient has consults for cardiology and neurosurgery. Per Doris at [facility] patient is not a bed hold but able to return at discharge. If patient is discharged after midnight he will need PT/OT evals and new insurance authorization. NEVIN/DINORAH to follow.     NEVIN Brown intern   Reviewed by BRITTANI Black

## 2024-04-16 NOTE — ED NOTES
ATTENDING PROVIDER ATTESTATION:     I have personally performed and/or participated in the history, exam, medical decision making, and procedures and agree with all pertinent clinical information.      I have also reviewed and agree with the past medical, family and social history unless otherwise noted.    I have discussed this patient in detail with the resident, and provided the instruction and education regarding chest pain.    My findings/Plan: I was the primary provider for patient.  History of sepsis history of saddle embolus history of nasal fracture history of C4 fracture history of DVT history of cholelithiasis presenting here because of midsternal chest pain aching.  Patient reports feeling short of breath he reports no abdominal pain.  Patient reports history of PE.  Patient reportedly was seen at [facility] and was readmitted there for recurrent bilateral PEs.  Patient reporting no abdominal pain there is no vomiting reports no black or tarry stools.  Patient reporting no active headache.  Patient is awake alert oriented x 3 heart exam normal lungs are clear abdomen is soft nontender.  Patient able to move all extremities.  Does have edema to his lower extremities.  Patient reports history of tobacco use.  Patient old records reviewed from [facility].  Patient had undergone thrombectomy as well as IVC filter placement.  Patient differential includes PE as well as pneumonia as well as heart failure as well as myocardial infarction.  Patient did receive aspirin by EMS.  EKG:  This EKG is signed and interpreted by me.    Rate: 98  Rhythm: Sinus  Interpretation: no acute changes  Comparison: stable as compared to patient's most recent EKG 3/27/2024  Labs were obtained patient's sodium is 138 potassium is 4 patient's BUN was 17 creatinine 0.9 patient's glucose is 126 patient's proBNP was 107 patient's troponin is 15 repeat was 16 patient's alk phos was 295 ALT and  and 180.  Patient

## 2024-04-16 NOTE — H&P
Inpatient H&P      PCP:  Cosme Marquez DO  Admitting Physician:  Della Nayak DO  Consultants:  cardio  Chief Complaint:    Chief Complaint   Patient presents with    Chest Pain     Starting 3 days ago, midsternal, non radiating. Pt in C-collar for C4 fx        History of Present Illness  Noel Guerrero is a 68 y.o. male who presents to [facility] ER complaining of chest pain.    Noel Guerrero has a past medical history that includes ICH, saddle PE, DVT, C4 fracture, cholelithiasis    Noel presents to the ER with complaint of chest pain.  He states that it started around 3 days ago but had been intermittent.  He states that earlier tonight he was in bed and it became much worse where he was unable to tolerate.  He states it is like a bandlike pressure across his chest. He said he took 4 baby aspirin which improved pain.  Of note, 3/19 he recently had syncopal episode which caused a fall and he was found to have saddle PE.  He was anticoagulated and underwent thrombectomy on 3/9 but his course was complicated due to intraparenchymal hematoma, so IVC filter was placed.  He was discharged 3/25.  He was readmitted 3/27 and found to have UTI and Jae with recurrence of pulmonary embolism.  Initial plan was to start anticoagulation 4 weeks from initial ICH 4/20/2024 according to [facility] notes.     He also states he has been having worsening swelling in his bilateral lower extremities. He has known previous DVT, but states these have been getting worse. He does have IVC filter now    CTA chest showed no evidence of pulmonary embolism.  Resolution of previously demonstrated pulmonary emboli noted.  CT of the head did show subacute bilateral inferior frontal parenchymal hemorrhagic contusions with moderate surrounding vasogenic edema and adjacent mass effect/sulcal effacement    Discussed patient's case with ED physician.    ER Course  Upon presentation to the ER, routine labwork was performed which revealed troponin 15,

## 2024-04-16 NOTE — ED PROVIDER NOTES
Patient : Raymond Mcclellan Age: 67 year old Sex: male   MRN: [id number] Encounter Date: 8/16/2021      History     Chief Complaint   Patient presents with   • Dizziness     Patient signed out to me by Dr. Youssef at this point in time.  Patient presenting to the emergency department with vertigo, stroke alert.  Patient found to have aortic dissection which patient was aware of based on previous diagnosis that he is currently having followed up with an outside hospital..  Patient on esmolol drip, cardiothoracic surgery evaluating patient.  Plan is to admit patient to the hospital for additional management.          No Known Allergies    Current Discharge Medication List      Prior to Admission Medications    Details   carvedilol (COREG) 25 MG tablet Take 25 mg by mouth 2 times daily (with meals).      atorvastatin (LIPITOR) 40 MG tablet Take 40 mg by mouth daily.      cefadroxil (DURICEF) 500 MG capsule Take 500 mg by mouth 2 times daily.      gabapentin (NEURONTIN) 100 MG capsule Take 100 mg by mouth 3 times daily.      sotalol (BETAPACE) 80 MG tablet Take 80 mg by mouth 2 times daily. Per patient, now BID      warfarin (COUMADIN) 6 MG tablet Take by mouth as directed. Alternates between 6 mg and 3 mg daily      aspirin 81 MG chewable tablet Chew 81 mg by mouth daily.             Past Medical History:   Diagnosis Date   • Stroke (CMS/HCC)        No past surgical history on file.    No family history on file.    Social History     Tobacco Use   • Smoking status: Not on file   Substance Use Topics   • Alcohol use: Not on file   • Drug use: Not on file       Review of Systems    Physical Exam     ED Triage Vitals   ED Triage Vitals Group      Temp 08/16/21 1413 98.6 °F (37 °C)      Heart Rate 08/16/21 1300 75      Resp 08/16/21 1300 18      BP 08/16/21 1300 (!) 147/91      SpO2 08/16/21 1300 100 %      EtCO2 mmHg --       Height 08/16/21 1712 6' 5.95\" (1.98 m)      Weight 08/16/21 1300 192 lb 9.6 oz (87.4 kg)       Weight Scale Used 08/16/21 1300 Scale in bed      BMI (Calculated) 08/16/21 1712 21.32      IBW/kg (Calculated) 08/16/21 1712 91.29       Physical Exam    ED Course     Critical Care  Performed by: Francesco Ramirez MD  Authorized by: Francesco Ramirez MD     Critical care provider statement:     Critical care time (minutes):  30    Critical care time was exclusive of:  Separately billable procedures and treating other patients and teaching time    Critical care was necessary to treat or prevent imminent or life-threatening deterioration of the following conditions:  Circulatory failure    Critical care was time spent personally by me on the following activities:  Development of treatment plan with patient or surrogate, discussions with consultants, discussions with primary provider, evaluation of patient's response to treatment, examination of patient, review of old charts, re-evaluation of patient's condition, pulse oximetry, ordering and review of radiographic studies, ordering and review of laboratory studies and ordering and performing treatments and interventions    I assumed direction of critical care for this patient from another provider in my specialty: yes          Lab Results     Results for orders placed or performed during the hospital encounter of 08/16/21   Comprehensive Metabolic Panel   Result Value Ref Range    Fasting Status      Sodium 140 135 - 145 mmol/L    Potassium 4.2 3.4 - 5.1 mmol/L    Chloride 107 98 - 107 mmol/L    Carbon Dioxide 25 21 - 32 mmol/L    Anion Gap 12 10 - 20 mmol/L    Glucose 127 (H) 65 - 99 mg/dL    BUN 19 6 - 20 mg/dL    Creatinine 1.25 (H) 0.67 - 1.17 mg/dL    Glomerular Filtration Rate 59 (L) >90 mL/min/1.73m2    BUN/ Creatinine Ratio 15 7 - 25    Calcium 8.2 (L) 8.4 - 10.2 mg/dL    Bilirubin, Total 0.9 0.2 - 1.0 mg/dL    GOT/AST 21 <=37 Units/L    GPT/ALT 24 <64 Units/L    Alkaline Phosphatase 100 45 - 117 Units/L    Albumin 3.5 (L) 3.6 - 5.1 g/dL    Protein,  Total 7.4 6.4 - 8.2 g/dL    Globulin 3.9 2.0 - 4.0 g/dL    A/G Ratio 0.9 (L) 1.0 - 2.4   Prothrombin Time   Result Value Ref Range    Prothrombin Time 37.2 (H) 9.7 - 11.8 sec    INR 3.7     Partial Thromboplastin Time   Result Value Ref Range    PTT 44 (H) 22 - 30 sec   Troponin I Ultra Sensitive   Result Value Ref Range    Troponin I, Ultra Sensitive <0.02 <=0.04 ng/mL   CBC with Automated Differential (performable only)   Result Value Ref Range    WBC 7.1 4.2 - 11.0 K/mcL    RBC 4.73 4.50 - 5.90 mil/mcL    HGB 13.8 13.0 - 17.0 g/dL    HCT 43.9 39.0 - 51.0 %    MCV 92.8 78.0 - 100.0 fl    MCH 29.2 26.0 - 34.0 pg    MCHC 31.4 (L) 32.0 - 36.5 g/dL    RDW-CV 20.8 (H) 11.0 - 15.0 %    RDW-SD 71.0 (H) 39.0 - 50.0 fL     140 - 450 K/mcL    NRBC 0 <=0 /100 WBC    Neutrophil, Percent 68 %    Lymphocytes, Percent 12 %    Mono, Percent 9 %    Eosinophils, Percent 9 %    Basophils, Percent 1 %    Immature Granulocytes 1 %    Absolute Neutrophils 4.9 1.8 - 7.7 K/mcL    Absolute Lymphocytes 0.8 (L) 1.0 - 4.0 K/mcL    Absolute Monocytes 0.6 0.3 - 0.9 K/mcL    Absolute Eosinophils  0.7 (H) 0.0 - 0.5 K/mcL    Absolute Basophils 0.1 0.0 - 0.3 K/mcL    Absolute Immmature Granulocytes 0.0 0.0 - 0.2 K/mcL   TYPE/SCREEN   Result Value Ref Range    ABO/RH(D) B Rh Positive     ANTIBODY SCREEN Negative     TYPE AND SCREEN EXPIRATION DATE 08/19/2021 23:59    GLUCOSE, BEDSIDE - POINT OF CARE   Result Value Ref Range    GLUCOSE, BEDSIDE - POINT OF CARE 127 (H) 70 - 99 mg/dL       EKG Results     EKG Interpretation  Sinus rhythm at 76 bpm.  No acute ST elevation or interval change.    EKG tracing interpreted by ED physician    Radiology Results     Imaging Results          CTA HEAD AND NECK W CONTRAST LEVEL 1 (Final result)  Result time 08/16/21 13:44:29    Final result                 Impression:    1.  Dissection descending aortic arch distal to the origins of the great  vessels (as detailed above. This was not seen on the prior CT  angiogram  April 5, 2020. No obvious periaortic hematoma or inflammatory fat  stranding. Multiple nonenlarged mediastinal lymph nodes are likely  reactive.  2. Mild aneurysmal dilatation right subclavian artery without dissection.  3. No evidence of significant narrowing, occlusion or dissection cervical  carotid systems and vertebral arteries.  4. Unremarkable CT angiogram of the brain.    Electronically Signed by: CATHRYN NOEL M.D.   Signed on: 8/16/2021 1:44 PM                Narrative:    EXAM: CTA HEAD AND NECK W CONTRAST LEVEL 1    CLINICAL INDICATION: Acute onset of dizziness, nausea and vomiting, Hearing  loss, difficulty walking.      COMPARISON: April 5, 2020.    TECHNIQUE: A helical pitch, 1 mm slice collimation, 1 mm reconstruction  interval CTA of the Brain and neck was performed from the thoracic inlet to  the skull vertex. 100 ml of Omnipaque-350 were injected intravenously  without complication. The images were processed on an independent [employer]  workstation by the radiologist and were transferred to the PACS system for  further evaluation for quantitative 2D and 3D analysis by the  Neuroradiologist. Automated exposure control employed as radiation dose  reduction strategy on this patient.    FINDINGS:       CTA Brain:  The bilateral cervical, petrous, cavernous and supraclinoid ICA segments  are unremarkable. The ICA bifurcations are normal in appearance.    The ACAs are unremarkable.  An ACOM is present.  The bilateral MCAs are  normal in contour and caliber, with no evidence of branch artery stenosis  or occlusion.    The intradural vertebral arteries are otherwise unremarkable bilaterally.   The basilar artery is unremarkable.  Bilateral SCAs are seen.  The PCAs are  unremarkable.    There are no aneurysms or other vascular anomalies.  Axial brain images show no infarct, mass, or abnormal collection.  There is  no intracranial bleed or hydrocephalus.          CTA Neck:   There is prominent  dilatation of the descending aortic arch (6 cm in  diameter). There is an obvious dissection at the beginning of the  descending aortic arch (immediately distal to the origin of the left  subclavian artery. Spiral shaped intimal flap divides smaller and more  intensely enhancing 2 lm located more anterior and left lateral and a  larger less intensely enhancing falsely lumen. Dissection does not appear  to involve the origins of the great vessels. There is aneurysmal dilatation  of the right subclavian artery which measures up to 2.7 cm in diameter.  This demonstrates scattered wall calcification without dissection. Calcific  plaque/wall calcification is noted at the origin of the left subclavian  artery without luminal narrowing.    Common carotid arteries appear unremarkable.   Mild calcific plaque at bilateral carotid bifurcations without luminal  narrowing. Cervical segments internal and external carotid arteries appear  unremarkable.  The soft tissues of the neck are unremarkable. Postsurgical changes  consistent with prior open heart surgery.                                CT HEAD STROKE ALERT LEVEL 1 WO CONTRAST (Final result)  Result time 08/16/21 13:19:47    Final result                 Impression:    No acute intracranial abnormality.    Dr. Anaya notified Dr. Alex of the critical findings via telephone on  8/16/2021 1:13 PM.      Noncontrast enhanced CT is a screening survey. Conditions such as vascular  malformations, aneurysms, low grade, tumors, meningitis, subtle  subarachnoid hemorrhages, etc., may not be demonstrated. Followup studies  as clinically indicated may be necessary.     Dictated by: CAMERON ANAYA DO  Dictated on: 8/16/2021 1:10 PM     CATHRYN MORTENSEN M.D., have reviewed the images and report and concur with  these findings interpreted by CAMERON ANAYA DO.    Electronically Signed by: CATHRYN NOEL M.D.   Signed on: 8/16/2021 1:19 PM                Narrative:    CT BRAIN WITHOUT  CONTRAST    CLINICAL INDICATION: Stroke    COMPARISON: CT head 04/05/2020    TECHNIQUE: Multiple axial images of the head were obtained from the base of  the skull to the vertex. Automated exposure control employed as radiation  dose reduction strategy on this patient.    FINDINGS:   The gray-white matter differentiation is maintained. There is no evidence  of acute intracranial or extra-axial hemorrhage. No areas of abnormal  parenchymal attenuation are identified.  Encephalomalacia involving the  left superior cerebellar peduncle, new since prior.    The ventricles and sulci are within normal limits for patient age. There is  no midline shift or mass effect. The basal cisterns are intact.  Low-lying  right cerebellar tonsil.    The orbital structures appear symmetric and unremarkable.  Mild mucosal  thickening of base of bilateral maxillary sinuses, the remaining paranasal  sinuses and mastoid air cells are clear. There are no aggressive bony  lesions.                                 ED Medication Orders (From admission, onward)    Ordered Start     Status Ordering Provider    08/16/21 1355 08/16/21 1355  trimethobenzamide (TIGAN) injection 200 mg  ONCE      Last MAR action: Given LORRAINE LOPEZ    08/16/21 1328 08/16/21 1328  niCARdipine (CARDENE) 40 mg/200 mL in NaCl infusion  CONTINUOUS      Last MAR action: Paused NU ALEX    08/16/21 1327 08/16/21 1327  esmolol (BREVIBLOC) 2,000 mg/100 mL in sodium chloride 0.9 % infusion  CONTINUOUS      Last MAR action: Infusion Continues to Floor NU ALEX          ED Course as of Aug 16 1815   Mon Aug 16, 2021   1300 Spoke with tele neurologist who is aware patient with no further recommendations at this time.    [ROOSEVELT]      ED Course User Index  [ROOSEVELT] Nu Alex MD       [facility]   Patient with history of aortic dissection, demonstrated on findings of CT scan, now with new neurological symptoms.  Labs reviewed, CT scans, EKG reviewed.   (LASIX) tablet 20 mg (has no administration in time range)   gabapentin (NEURONTIN) capsule 300 mg (has no administration in time range)   mirtazapine (REMERON) tablet 15 mg (has no administration in time range)   pantoprazole (PROTONIX) tablet 40 mg (has no administration in time range)   tamsulosin (FLOMAX) capsule 0.4 mg (has no administration in time range)   QUEtiapine (SEROQUEL) tablet 12.5 mg (has no administration in time range)   iopamidol (ISOVUE-370) 76 % injection 75 mL (75 mLs IntraVENous Given 4/16/24 4543)         Discussion: Patient presents with chest pain ongoing for the last 3 days which was mild in severity but he came in today because it became moderate in severity.  The patient has had a complex recent medical history including saddle pulmonary embolism 1 month ago discovered after a fall that was started on anticoagulation and subsequently noted to have intracranial hemorrhage.  Anticoagulation was discontinued, IVC filter placed.  Patient was then admitted to the hospital 2 weeks later for sepsis.      Today, the patient is complaining of chest pain.  His hemoglobin is decreased from baseline but no evidence of bleeding and not taking any anticoagulation.  proBNP is normal, INR is 1.2, platelet count is elevated but has been in the past month as well.  LFTs including alk phos, ALT and AST are elevated which they have been in the last month as well but more elevated than usual with alk phos being 295, , .  The patient is not complaining of any abdominal pain, does not appear jaundiced.  Patient had a chest x-ray which was unconcerning.  CT of his head shows the sequela of his previous head bleed.  CTA pulmonary does not show any evidence of pulmonary embolism this time.    Patient is to be admitted to the hospital for further management and evaluation of his chest pain, I spoke with Dr. Matamoros who accepts the patient.            Is this patient to be included in the SEP-1 core  Cardiothoracic surgery was consulted, recommends that patient be continued on esmolol and nicardipine drips.  Requested patient be admitted to the medical intensive care unit.  Discussed with critical care attending.  Discussed with medical admission team, ICU, and patient admitted to the hospital for additional management.    Clinical Impression     ED Diagnosis   1. Dizziness     2. Dissection of aorta, unspecified portion of aorta (CMS/HCC)         Disposition        Admit 8/16/2021  3:39 PM  Telemetry Bed?: Yes  Admitting Physician: CECIL MAJANO [[id number]]  Comments: vaccinated  Is this a telephone or verbal order?: This is a telephone order from the admitting physician                     Francesco Ramirez MD  08/16/21 4950

## 2024-04-17 ENCOUNTER — APPOINTMENT (OUTPATIENT)
Dept: MRI IMAGING | Age: 69
DRG: 286 | End: 2024-04-17
Payer: MEDICARE

## 2024-04-17 PROBLEM — G93.6 CEREBRAL EDEMA (HCC): Status: ACTIVE | Noted: 2024-04-17

## 2024-04-17 PROBLEM — R60.0 LEG EDEMA: Status: ACTIVE | Noted: 2024-04-17

## 2024-04-17 PROBLEM — I82.423 ACUTE DEEP VEIN THROMBOSIS (DVT) OF BOTH ILIOFEMORAL VEINS (HCC): Status: ACTIVE | Noted: 2024-04-17

## 2024-04-17 LAB
ALBUMIN SERPL-MCNC: 3.2 G/DL (ref 3.5–5.2)
ALP SERPL-CCNC: 263 U/L (ref 40–129)
ALT SERPL-CCNC: 218 U/L (ref 0–40)
ANION GAP SERPL CALCULATED.3IONS-SCNC: 13 MMOL/L (ref 7–16)
AST SERPL-CCNC: 107 U/L (ref 0–39)
BILIRUB SERPL-MCNC: 0.5 MG/DL (ref 0–1.2)
BUN SERPL-MCNC: 15 MG/DL (ref 6–23)
CALCIUM SERPL-MCNC: 8.8 MG/DL (ref 8.6–10.2)
CHLORIDE SERPL-SCNC: 107 MMOL/L (ref 98–107)
CO2 SERPL-SCNC: 21 MMOL/L (ref 22–29)
CREAT SERPL-MCNC: 0.9 MG/DL (ref 0.7–1.2)
ERYTHROCYTE [DISTWIDTH] IN BLOOD BY AUTOMATED COUNT: 13.8 % (ref 11.5–15)
GFR SERPL CREATININE-BSD FRML MDRD: >90 ML/MIN/1.73M2
GLUCOSE SERPL-MCNC: 93 MG/DL (ref 74–99)
HCT VFR BLD AUTO: 31.4 % (ref 37–54)
HGB BLD-MCNC: 9.9 G/DL (ref 12.5–16.5)
MCH RBC QN AUTO: 29.9 PG (ref 26–35)
MCHC RBC AUTO-ENTMCNC: 31.5 G/DL (ref 32–34.5)
MCV RBC AUTO: 94.9 FL (ref 80–99.9)
PLATELET # BLD AUTO: 472 K/UL (ref 130–450)
PMV BLD AUTO: 8.7 FL (ref 7–12)
POTASSIUM SERPL-SCNC: 3.8 MMOL/L (ref 3.5–5)
PROT SERPL-MCNC: 5.9 G/DL (ref 6.4–8.3)
RBC # BLD AUTO: 3.31 M/UL (ref 3.8–5.8)
SODIUM SERPL-SCNC: 141 MMOL/L (ref 132–146)
WBC OTHER # BLD: 7.7 K/UL (ref 4.5–11.5)

## 2024-04-17 PROCEDURE — G0378 HOSPITAL OBSERVATION PER HR: HCPCS

## 2024-04-17 PROCEDURE — 80053 COMPREHEN METABOLIC PANEL: CPT

## 2024-04-17 PROCEDURE — 97165 OT EVAL LOW COMPLEX 30 MIN: CPT

## 2024-04-17 PROCEDURE — 99233 SBSQ HOSP IP/OBS HIGH 50: CPT | Performed by: FAMILY MEDICINE

## 2024-04-17 PROCEDURE — 97535 SELF CARE MNGMENT TRAINING: CPT

## 2024-04-17 PROCEDURE — 99222 1ST HOSP IP/OBS MODERATE 55: CPT | Performed by: PHYSICIAN ASSISTANT

## 2024-04-17 PROCEDURE — 6370000000 HC RX 637 (ALT 250 FOR IP): Performed by: INTERNAL MEDICINE

## 2024-04-17 PROCEDURE — 36415 COLL VENOUS BLD VENIPUNCTURE: CPT

## 2024-04-17 PROCEDURE — 6360000002 HC RX W HCPCS: Performed by: FAMILY MEDICINE

## 2024-04-17 PROCEDURE — 70544 MR ANGIOGRAPHY HEAD W/O DYE: CPT

## 2024-04-17 PROCEDURE — 96374 THER/PROPH/DIAG INJ IV PUSH: CPT

## 2024-04-17 PROCEDURE — 6370000000 HC RX 637 (ALT 250 FOR IP): Performed by: FAMILY MEDICINE

## 2024-04-17 PROCEDURE — 97530 THERAPEUTIC ACTIVITIES: CPT

## 2024-04-17 PROCEDURE — 2580000003 HC RX 258: Performed by: INTERNAL MEDICINE

## 2024-04-17 PROCEDURE — 97161 PT EVAL LOW COMPLEX 20 MIN: CPT

## 2024-04-17 PROCEDURE — 70551 MRI BRAIN STEM W/O DYE: CPT

## 2024-04-17 PROCEDURE — 99232 SBSQ HOSP IP/OBS MODERATE 35: CPT | Performed by: INTERNAL MEDICINE

## 2024-04-17 PROCEDURE — 85027 COMPLETE CBC AUTOMATED: CPT

## 2024-04-17 RX ORDER — NITROGLYCERIN 0.4 MG/1
0.4 TABLET SUBLINGUAL EVERY 5 MIN PRN
Status: DISCONTINUED | OUTPATIENT
Start: 2024-04-17 | End: 2024-04-23 | Stop reason: HOSPADM

## 2024-04-17 RX ORDER — METOPROLOL SUCCINATE 25 MG/1
25 TABLET, EXTENDED RELEASE ORAL DAILY
Status: DISCONTINUED | OUTPATIENT
Start: 2024-04-17 | End: 2024-04-23 | Stop reason: HOSPADM

## 2024-04-17 RX ORDER — ATORVASTATIN CALCIUM 40 MG/1
40 TABLET, FILM COATED ORAL NIGHTLY
Status: DISCONTINUED | OUTPATIENT
Start: 2024-04-17 | End: 2024-04-23 | Stop reason: HOSPADM

## 2024-04-17 RX ORDER — LORAZEPAM 2 MG/ML
1.5 INJECTION INTRAMUSCULAR ONCE
Status: COMPLETED | OUTPATIENT
Start: 2024-04-17 | End: 2024-04-17

## 2024-04-17 RX ADMIN — Medication 1000 UNITS: at 09:33

## 2024-04-17 RX ADMIN — SODIUM CHLORIDE, PRESERVATIVE FREE 10 ML: 5 INJECTION INTRAVENOUS at 20:00

## 2024-04-17 RX ADMIN — LORAZEPAM 1.5 MG: 2 INJECTION INTRAMUSCULAR; INTRAVENOUS at 19:59

## 2024-04-17 RX ADMIN — ATORVASTATIN CALCIUM 40 MG: 40 TABLET, FILM COATED ORAL at 19:59

## 2024-04-17 RX ADMIN — FUROSEMIDE 20 MG: 20 TABLET ORAL at 09:33

## 2024-04-17 RX ADMIN — PANTOPRAZOLE SODIUM 40 MG: 40 TABLET, DELAYED RELEASE ORAL at 07:39

## 2024-04-17 RX ADMIN — SENNOSIDES AND DOCUSATE SODIUM 1 TABLET: 50; 8.6 TABLET ORAL at 09:33

## 2024-04-17 RX ADMIN — SODIUM CHLORIDE, PRESERVATIVE FREE 10 ML: 5 INJECTION INTRAVENOUS at 09:34

## 2024-04-17 RX ADMIN — GABAPENTIN 300 MG: 300 CAPSULE ORAL at 19:59

## 2024-04-17 RX ADMIN — GABAPENTIN 300 MG: 300 CAPSULE ORAL at 09:34

## 2024-04-17 RX ADMIN — METOPROLOL SUCCINATE 25 MG: 25 TABLET, EXTENDED RELEASE ORAL at 13:42

## 2024-04-17 RX ADMIN — TAMSULOSIN HYDROCHLORIDE 0.4 MG: 0.4 CAPSULE ORAL at 09:34

## 2024-04-17 RX ADMIN — QUETIAPINE FUMARATE 12.5 MG: 25 TABLET ORAL at 20:22

## 2024-04-17 RX ADMIN — GABAPENTIN 300 MG: 300 CAPSULE ORAL at 13:42

## 2024-04-17 RX ADMIN — MIRTAZAPINE 15 MG: 15 TABLET, FILM COATED ORAL at 19:59

## 2024-04-17 NOTE — ACP (ADVANCE CARE PLANNING)
4/17/24, Advance Care Planning   Healthcare Decision Maker:    Primary Decision Maker: DANIELLEMADY - Dyignl - [phone]    Click here to complete Healthcare Decision Makers including selection of the Healthcare Decision Maker Relationship (ie \"Primary\").

## 2024-04-17 NOTE — CARE COORDINATION
4/17/24, Patient admitted for chest pain.  Patient is from [facility] and is not a bed hold.  Per Deja from [facility] patient can return but will need a precert.  Informed Deja that Dee in the CM department will start precert.  Met with patient in room and discussed discharge plan which patient is in agreement with at this time.  Patient declined for this worker to contact family on discharge plan.  No 7000 will be needed.  Ambulance/ambulette form will need completed on day of discharge, face sheet and envelope is on soft chart.  SW to follow.      Case Management Assessment  Initial Evaluation    Date/Time of Evaluation: 4/17/2024 12:28 PM  Assessment Completed by: BRITTANI Card    If patient is discharged prior to next notation, then this note serves as note for discharge by case management.    Patient Name: Noel Guerrero                   YOB: 1955  Diagnosis: Leg edema [R60.0]  Chest pain [R07.9]  Chest pain, unspecified type [R07.9]                   Date / Time: 4/16/2024 12:11 AM    Patient Admission Status: Observation   Readmission Risk (Low < 19, Mod (19-27), High > 27): No data recorded  Current PCP: Cosme Marquez, DO  PCP verified by ? Yes    Chart Reviewed: Yes      History Provided by: Patient  Patient Orientation: Alert and Oriented    Patient Cognition: Alert    Hospitalization in the last 30 days (Readmission):  No    If yes, Readmission Assessment in  Navigator will be completed.    Advance Directives:      Code Status: Full Code   Patient's Primary Decision Maker is:      Primary Decision Maker: MADY SANTOS - Spouse - [phone]    Discharge Planning:    Patient lives with: Other (Comment) (facility) Type of Home: Skilled Nursing Facility  Primary Care Giver: Other (Comment) (Patient came from a [facility].)  Patient Support Systems include: Spouse/Significant Other   Current Financial resources:    Current community resources:    Current services prior to

## 2024-04-17 NOTE — ACP (ADVANCE CARE PLANNING)
4/17/24, Advance Care Planning   Healthcare Decision Maker:    Primary Decision Maker: DANIELLEMADY - Jzkyef - [phone]    Click here to complete Healthcare Decision Makers including selection of the Healthcare Decision Maker Relationship (ie \"Primary\").

## 2024-04-17 NOTE — CARE COORDINATION
4/17/24, NEVIN Update-Per Dee in CM department precert is good through tomorrow from when patient was already at [facility].  No new precert is needed at this time.  Patient can return back to the facility when medically cleared.  No 7000 is needed.  Ambulance/ambulette form (will need to be completed on discharge day), face sheet and envelope is on soft chart. SW to follow.      BRITTANI Card  [facility] Case Management  [phone]

## 2024-04-18 LAB
ALBUMIN SERPL-MCNC: 3.3 G/DL (ref 3.5–5.2)
ALP SERPL-CCNC: 235 U/L (ref 40–129)
ALT SERPL-CCNC: 147 U/L (ref 0–40)
ANION GAP SERPL CALCULATED.3IONS-SCNC: 12 MMOL/L (ref 7–16)
AST SERPL-CCNC: 46 U/L (ref 0–39)
BILIRUB SERPL-MCNC: 0.5 MG/DL (ref 0–1.2)
BUN SERPL-MCNC: 15 MG/DL (ref 6–23)
CALCIUM SERPL-MCNC: 9 MG/DL (ref 8.6–10.2)
CHLORIDE SERPL-SCNC: 105 MMOL/L (ref 98–107)
CO2 SERPL-SCNC: 24 MMOL/L (ref 22–29)
CREAT SERPL-MCNC: 1 MG/DL (ref 0.7–1.2)
ERYTHROCYTE [DISTWIDTH] IN BLOOD BY AUTOMATED COUNT: 13.8 % (ref 11.5–15)
GFR SERPL CREATININE-BSD FRML MDRD: 79 ML/MIN/1.73M2
GLUCOSE SERPL-MCNC: 93 MG/DL (ref 74–99)
HCT VFR BLD AUTO: 32.2 % (ref 37–54)
HGB BLD-MCNC: 9.9 G/DL (ref 12.5–16.5)
MCH RBC QN AUTO: 29.8 PG (ref 26–35)
MCHC RBC AUTO-ENTMCNC: 30.7 G/DL (ref 32–34.5)
MCV RBC AUTO: 97 FL (ref 80–99.9)
PLATELET # BLD AUTO: 465 K/UL (ref 130–450)
PMV BLD AUTO: 8.7 FL (ref 7–12)
POTASSIUM SERPL-SCNC: 4.2 MMOL/L (ref 3.5–5)
PROT SERPL-MCNC: 6 G/DL (ref 6.4–8.3)
RBC # BLD AUTO: 3.32 M/UL (ref 3.8–5.8)
SODIUM SERPL-SCNC: 141 MMOL/L (ref 132–146)
WBC OTHER # BLD: 8.7 K/UL (ref 4.5–11.5)

## 2024-04-18 PROCEDURE — 6370000000 HC RX 637 (ALT 250 FOR IP): Performed by: INTERNAL MEDICINE

## 2024-04-18 PROCEDURE — G0378 HOSPITAL OBSERVATION PER HR: HCPCS

## 2024-04-18 PROCEDURE — 36415 COLL VENOUS BLD VENIPUNCTURE: CPT

## 2024-04-18 PROCEDURE — 80053 COMPREHEN METABOLIC PANEL: CPT

## 2024-04-18 PROCEDURE — 2580000003 HC RX 258: Performed by: INTERNAL MEDICINE

## 2024-04-18 PROCEDURE — 99232 SBSQ HOSP IP/OBS MODERATE 35: CPT | Performed by: INTERNAL MEDICINE

## 2024-04-18 PROCEDURE — 85027 COMPLETE CBC AUTOMATED: CPT

## 2024-04-18 PROCEDURE — 99223 1ST HOSP IP/OBS HIGH 75: CPT | Performed by: PSYCHIATRY & NEUROLOGY

## 2024-04-18 PROCEDURE — 99233 SBSQ HOSP IP/OBS HIGH 50: CPT | Performed by: STUDENT IN AN ORGANIZED HEALTH CARE EDUCATION/TRAINING PROGRAM

## 2024-04-18 PROCEDURE — 6370000000 HC RX 637 (ALT 250 FOR IP): Performed by: FAMILY MEDICINE

## 2024-04-18 RX ADMIN — PANTOPRAZOLE SODIUM 40 MG: 40 TABLET, DELAYED RELEASE ORAL at 05:21

## 2024-04-18 RX ADMIN — TAMSULOSIN HYDROCHLORIDE 0.4 MG: 0.4 CAPSULE ORAL at 09:04

## 2024-04-18 RX ADMIN — METOPROLOL SUCCINATE 25 MG: 25 TABLET, EXTENDED RELEASE ORAL at 09:04

## 2024-04-18 RX ADMIN — GABAPENTIN 300 MG: 300 CAPSULE ORAL at 20:58

## 2024-04-18 RX ADMIN — FUROSEMIDE 20 MG: 20 TABLET ORAL at 09:04

## 2024-04-18 RX ADMIN — GABAPENTIN 300 MG: 300 CAPSULE ORAL at 13:46

## 2024-04-18 RX ADMIN — QUETIAPINE FUMARATE 12.5 MG: 25 TABLET ORAL at 20:57

## 2024-04-18 RX ADMIN — ATORVASTATIN CALCIUM 40 MG: 40 TABLET, FILM COATED ORAL at 20:58

## 2024-04-18 RX ADMIN — SENNOSIDES AND DOCUSATE SODIUM 1 TABLET: 50; 8.6 TABLET ORAL at 09:04

## 2024-04-18 RX ADMIN — GABAPENTIN 300 MG: 300 CAPSULE ORAL at 09:04

## 2024-04-18 RX ADMIN — Medication 1000 UNITS: at 09:04

## 2024-04-18 RX ADMIN — SODIUM CHLORIDE, PRESERVATIVE FREE 10 ML: 5 INJECTION INTRAVENOUS at 20:58

## 2024-04-18 RX ADMIN — SODIUM CHLORIDE, PRESERVATIVE FREE 10 ML: 5 INJECTION INTRAVENOUS at 09:04

## 2024-04-18 RX ADMIN — MIRTAZAPINE 15 MG: 15 TABLET, FILM COATED ORAL at 20:58

## 2024-04-18 ASSESSMENT — PAIN SCALES - GENERAL: PAINLEVEL_OUTOF10: 9

## 2024-04-18 NOTE — CARE COORDINATION
4/18/25, NEVIN spoke with Dee from CM department.  Dee has resubmitted for precert since precert was ending today.  Patient is not medically ready for discharge.  Updated wife in room-patient was resting in bed.  Discussed resubmitting for precert to the insurance-wife understood.  Patient will be a return back to [facility] when precert is obtained and patient is medically cleared for discharge.  Ambulance/ambulette form (will need completed on day of discharge), face sheet and envelope is on soft chart.  SW to follow.        Mari Yoon, ZAHRA  [facility] Case Management  [phone]

## 2024-04-18 NOTE — PROCEDURES
8465 spoke with pts Rn regarding ordered coronary cta, RN states that pt just ate lunch.  Pt will be tomorrow am.  Instructed rn to make pt NPO after mn tonight, obtain #20 diffusics PIV. Protocol faxed to floor

## 2024-04-19 ENCOUNTER — APPOINTMENT (OUTPATIENT)
Dept: CT IMAGING | Age: 69
DRG: 286 | End: 2024-04-19
Payer: MEDICARE

## 2024-04-19 LAB
ALBUMIN SERPL-MCNC: 3.2 G/DL (ref 3.5–5.2)
ALP SERPL-CCNC: 194 U/L (ref 40–129)
ALT SERPL-CCNC: 103 U/L (ref 0–40)
ANION GAP SERPL CALCULATED.3IONS-SCNC: 13 MMOL/L (ref 7–16)
AST SERPL-CCNC: 31 U/L (ref 0–39)
BILIRUB SERPL-MCNC: 0.5 MG/DL (ref 0–1.2)
BUN SERPL-MCNC: 13 MG/DL (ref 6–23)
CALCIUM SERPL-MCNC: 8.8 MG/DL (ref 8.6–10.2)
CHLORIDE SERPL-SCNC: 106 MMOL/L (ref 98–107)
CO2 SERPL-SCNC: 22 MMOL/L (ref 22–29)
CREAT SERPL-MCNC: 0.9 MG/DL (ref 0.7–1.2)
ERYTHROCYTE [DISTWIDTH] IN BLOOD BY AUTOMATED COUNT: 13.9 % (ref 11.5–15)
GFR SERPL CREATININE-BSD FRML MDRD: >90 ML/MIN/1.73M2
GLUCOSE SERPL-MCNC: 97 MG/DL (ref 74–99)
HCT VFR BLD AUTO: 31.3 % (ref 37–54)
HGB BLD-MCNC: 9.8 G/DL (ref 12.5–16.5)
MCH RBC QN AUTO: 30 PG (ref 26–35)
MCHC RBC AUTO-ENTMCNC: 31.3 G/DL (ref 32–34.5)
MCV RBC AUTO: 95.7 FL (ref 80–99.9)
PLATELET # BLD AUTO: 437 K/UL (ref 130–450)
PMV BLD AUTO: 8.8 FL (ref 7–12)
POTASSIUM SERPL-SCNC: 4 MMOL/L (ref 3.5–5)
PROT SERPL-MCNC: 5.7 G/DL (ref 6.4–8.3)
RBC # BLD AUTO: 3.27 M/UL (ref 3.8–5.8)
SODIUM SERPL-SCNC: 141 MMOL/L (ref 132–146)
WBC OTHER # BLD: 9 K/UL (ref 4.5–11.5)

## 2024-04-19 PROCEDURE — 6360000004 HC RX CONTRAST MEDICATION: Performed by: RADIOLOGY

## 2024-04-19 PROCEDURE — 2580000003 HC RX 258: Performed by: INTERNAL MEDICINE

## 2024-04-19 PROCEDURE — 99232 SBSQ HOSP IP/OBS MODERATE 35: CPT | Performed by: INTERNAL MEDICINE

## 2024-04-19 PROCEDURE — 6370000000 HC RX 637 (ALT 250 FOR IP): Performed by: INTERNAL MEDICINE

## 2024-04-19 PROCEDURE — 75574 CT ANGIO HRT W/3D IMAGE: CPT | Performed by: INTERNAL MEDICINE

## 2024-04-19 PROCEDURE — 99232 SBSQ HOSP IP/OBS MODERATE 35: CPT | Performed by: STUDENT IN AN ORGANIZED HEALTH CARE EDUCATION/TRAINING PROGRAM

## 2024-04-19 PROCEDURE — 80053 COMPREHEN METABOLIC PANEL: CPT

## 2024-04-19 PROCEDURE — 36415 COLL VENOUS BLD VENIPUNCTURE: CPT

## 2024-04-19 PROCEDURE — 75574 CT ANGIO HRT W/3D IMAGE: CPT

## 2024-04-19 PROCEDURE — 1200000000 HC SEMI PRIVATE

## 2024-04-19 PROCEDURE — 6370000000 HC RX 637 (ALT 250 FOR IP): Performed by: FAMILY MEDICINE

## 2024-04-19 PROCEDURE — 85027 COMPLETE CBC AUTOMATED: CPT

## 2024-04-19 PROCEDURE — 2500000003 HC RX 250 WO HCPCS: Performed by: INTERNAL MEDICINE

## 2024-04-19 RX ORDER — METOPROLOL TARTRATE 50 MG/1
50 TABLET, FILM COATED ORAL ONCE
Status: COMPLETED | OUTPATIENT
Start: 2024-04-19 | End: 2024-04-19

## 2024-04-19 RX ORDER — METOPROLOL TARTRATE 1 MG/ML
10 INJECTION, SOLUTION INTRAVENOUS ONCE AS NEEDED
Status: DISCONTINUED | OUTPATIENT
Start: 2024-04-19 | End: 2024-04-23 | Stop reason: HOSPADM

## 2024-04-19 RX ORDER — METOPROLOL TARTRATE 1 MG/ML
5 INJECTION, SOLUTION INTRAVENOUS
Status: COMPLETED | OUTPATIENT
Start: 2024-04-19 | End: 2024-04-19

## 2024-04-19 RX ORDER — METOPROLOL TARTRATE 1 MG/ML
INJECTION, SOLUTION INTRAVENOUS
Status: DISPENSED
Start: 2024-04-19 | End: 2024-04-20

## 2024-04-19 RX ORDER — 0.9 % SODIUM CHLORIDE 0.9 %
500 INTRAVENOUS SOLUTION INTRAVENOUS ONCE
Status: COMPLETED | OUTPATIENT
Start: 2024-04-19 | End: 2024-04-19

## 2024-04-19 RX ORDER — SODIUM CHLORIDE 9 MG/ML
INJECTION, SOLUTION INTRAVENOUS ONCE
Status: COMPLETED | OUTPATIENT
Start: 2024-04-19 | End: 2024-04-19

## 2024-04-19 RX ORDER — NITROGLYCERIN 0.4 MG/1
0.4 TABLET SUBLINGUAL ONCE AS NEEDED
Status: COMPLETED | OUTPATIENT
Start: 2024-04-19 | End: 2024-04-19

## 2024-04-19 RX ADMIN — SODIUM CHLORIDE, PRESERVATIVE FREE 10 ML: 5 INJECTION INTRAVENOUS at 21:33

## 2024-04-19 RX ADMIN — SODIUM CHLORIDE: 9 INJECTION, SOLUTION INTRAVENOUS at 12:18

## 2024-04-19 RX ADMIN — GABAPENTIN 300 MG: 300 CAPSULE ORAL at 21:32

## 2024-04-19 RX ADMIN — NITROGLYCERIN 0.4 MG: 0.4 TABLET SUBLINGUAL at 17:17

## 2024-04-19 RX ADMIN — SODIUM CHLORIDE: 9 INJECTION, SOLUTION INTRAVENOUS at 10:54

## 2024-04-19 RX ADMIN — PANTOPRAZOLE SODIUM 40 MG: 40 TABLET, DELAYED RELEASE ORAL at 05:23

## 2024-04-19 RX ADMIN — SODIUM CHLORIDE 500 ML: 9 INJECTION, SOLUTION INTRAVENOUS at 15:30

## 2024-04-19 RX ADMIN — METOPROLOL TARTRATE 50 MG: 50 TABLET, FILM COATED ORAL at 14:23

## 2024-04-19 RX ADMIN — SENNOSIDES AND DOCUSATE SODIUM 1 TABLET: 50; 8.6 TABLET ORAL at 09:24

## 2024-04-19 RX ADMIN — IVABRADINE 15 MG: 5 TABLET, FILM COATED ORAL at 12:19

## 2024-04-19 RX ADMIN — ATORVASTATIN CALCIUM 40 MG: 40 TABLET, FILM COATED ORAL at 21:32

## 2024-04-19 RX ADMIN — METOPROLOL TARTRATE 5 MG: 1 INJECTION, SOLUTION INTRAVENOUS at 13:59

## 2024-04-19 RX ADMIN — SODIUM CHLORIDE, PRESERVATIVE FREE 10 ML: 5 INJECTION INTRAVENOUS at 09:25

## 2024-04-19 RX ADMIN — Medication 1000 UNITS: at 09:24

## 2024-04-19 RX ADMIN — METOPROLOL TARTRATE 50 MG: 50 TABLET, FILM COATED ORAL at 12:19

## 2024-04-19 RX ADMIN — TAMSULOSIN HYDROCHLORIDE 0.4 MG: 0.4 CAPSULE ORAL at 09:24

## 2024-04-19 RX ADMIN — GABAPENTIN 300 MG: 300 CAPSULE ORAL at 09:25

## 2024-04-19 RX ADMIN — IOPAMIDOL 70 ML: 755 INJECTION, SOLUTION INTRAVENOUS at 17:09

## 2024-04-19 RX ADMIN — METOPROLOL TARTRATE 5 MG: 1 INJECTION, SOLUTION INTRAVENOUS at 13:54

## 2024-04-19 RX ADMIN — MIRTAZAPINE 15 MG: 15 TABLET, FILM COATED ORAL at 21:32

## 2024-04-19 RX ADMIN — QUETIAPINE FUMARATE 12.5 MG: 25 TABLET ORAL at 21:33

## 2024-04-19 RX ADMIN — GABAPENTIN 300 MG: 300 CAPSULE ORAL at 13:53

## 2024-04-19 NOTE — CARE COORDINATION
4/19/24, Precert has been re-submitted to insurance per Dee  department as of yesterday.  Still waiting to have precert obtained.  Coronary CTA was cancelled for today due to patient's blood pressure.  Cardiology to see.  Should precert be obtained for [facility] and patient is medically cleared for discharge patient can go to facility.  Person to contact If discharges over weekend is Deja Acosta at [phone].  Ambulance/ambulette form (will need completed on day of discharge), face sheet and envelope is on soft chart.  Patient is a return back to [facility]-precert was needed.  SW to follow.      Mari Yoon ZAHRA  [facility] Case Management  [phone]

## 2024-04-19 NOTE — PROCEDURES
0890 Spoke with pts RN regarding ordered coronary cta. 4/18 it was requested to have a #20 rac placed in RAC, no piv obtained.  RN instructed to call DR Jose Luis nam to update on pt hisory, vs & piv. Protocol faxed to floor 4/18, requested to have RN call dept with pts hr is consistently <60 and  return pgs 5,6 completed when pt sent to CT for exam

## 2024-04-19 NOTE — PROCEDURES
0570 Spoke with RN regarding pts ordered coronary cta.  Notified that Dr Amaya is requesting to be called with pts history/vs asap. RN verbalized understanding

## 2024-04-19 NOTE — CARE COORDINATION
4/19/24, Patient to have coronary CTA today.  Discharge plan remains back to [facility].

## 2024-04-20 PROCEDURE — 1200000000 HC SEMI PRIVATE

## 2024-04-20 PROCEDURE — 2580000003 HC RX 258: Performed by: INTERNAL MEDICINE

## 2024-04-20 PROCEDURE — 99232 SBSQ HOSP IP/OBS MODERATE 35: CPT | Performed by: STUDENT IN AN ORGANIZED HEALTH CARE EDUCATION/TRAINING PROGRAM

## 2024-04-20 PROCEDURE — 6370000000 HC RX 637 (ALT 250 FOR IP): Performed by: FAMILY MEDICINE

## 2024-04-20 PROCEDURE — 6370000000 HC RX 637 (ALT 250 FOR IP): Performed by: INTERNAL MEDICINE

## 2024-04-20 PROCEDURE — 99232 SBSQ HOSP IP/OBS MODERATE 35: CPT | Performed by: CLINICAL NURSE SPECIALIST

## 2024-04-20 RX ADMIN — QUETIAPINE FUMARATE 12.5 MG: 25 TABLET ORAL at 21:23

## 2024-04-20 RX ADMIN — SODIUM CHLORIDE, PRESERVATIVE FREE 10 ML: 5 INJECTION INTRAVENOUS at 21:23

## 2024-04-20 RX ADMIN — GABAPENTIN 300 MG: 300 CAPSULE ORAL at 15:56

## 2024-04-20 RX ADMIN — Medication 1000 UNITS: at 09:27

## 2024-04-20 RX ADMIN — MIRTAZAPINE 15 MG: 15 TABLET, FILM COATED ORAL at 21:23

## 2024-04-20 RX ADMIN — TAMSULOSIN HYDROCHLORIDE 0.4 MG: 0.4 CAPSULE ORAL at 09:27

## 2024-04-20 RX ADMIN — SENNOSIDES AND DOCUSATE SODIUM 1 TABLET: 50; 8.6 TABLET ORAL at 09:28

## 2024-04-20 RX ADMIN — GABAPENTIN 300 MG: 300 CAPSULE ORAL at 21:23

## 2024-04-20 RX ADMIN — GABAPENTIN 300 MG: 300 CAPSULE ORAL at 09:28

## 2024-04-20 RX ADMIN — ATORVASTATIN CALCIUM 40 MG: 40 TABLET, FILM COATED ORAL at 21:23

## 2024-04-20 RX ADMIN — SODIUM CHLORIDE, PRESERVATIVE FREE 10 ML: 5 INJECTION INTRAVENOUS at 09:29

## 2024-04-20 RX ADMIN — PANTOPRAZOLE SODIUM 40 MG: 40 TABLET, DELAYED RELEASE ORAL at 06:36

## 2024-04-21 LAB — GLUCOSE BLD-MCNC: 127 MG/DL (ref 74–99)

## 2024-04-21 PROCEDURE — 2580000003 HC RX 258: Performed by: INTERNAL MEDICINE

## 2024-04-21 PROCEDURE — 82962 GLUCOSE BLOOD TEST: CPT

## 2024-04-21 PROCEDURE — 99232 SBSQ HOSP IP/OBS MODERATE 35: CPT | Performed by: STUDENT IN AN ORGANIZED HEALTH CARE EDUCATION/TRAINING PROGRAM

## 2024-04-21 PROCEDURE — 1200000000 HC SEMI PRIVATE

## 2024-04-21 PROCEDURE — 6370000000 HC RX 637 (ALT 250 FOR IP): Performed by: FAMILY MEDICINE

## 2024-04-21 PROCEDURE — 6370000000 HC RX 637 (ALT 250 FOR IP): Performed by: INTERNAL MEDICINE

## 2024-04-21 RX ADMIN — PANTOPRAZOLE SODIUM 40 MG: 40 TABLET, DELAYED RELEASE ORAL at 10:16

## 2024-04-21 RX ADMIN — Medication 1000 UNITS: at 10:14

## 2024-04-21 RX ADMIN — Medication 3 MG: at 20:36

## 2024-04-21 RX ADMIN — MIRTAZAPINE 15 MG: 15 TABLET, FILM COATED ORAL at 20:36

## 2024-04-21 RX ADMIN — SODIUM CHLORIDE, PRESERVATIVE FREE 10 ML: 5 INJECTION INTRAVENOUS at 20:46

## 2024-04-21 RX ADMIN — QUETIAPINE FUMARATE 12.5 MG: 25 TABLET ORAL at 20:37

## 2024-04-21 RX ADMIN — GABAPENTIN 300 MG: 300 CAPSULE ORAL at 10:15

## 2024-04-21 RX ADMIN — ATORVASTATIN CALCIUM 40 MG: 40 TABLET, FILM COATED ORAL at 20:36

## 2024-04-21 RX ADMIN — FUROSEMIDE 20 MG: 20 TABLET ORAL at 10:14

## 2024-04-21 RX ADMIN — GABAPENTIN 300 MG: 300 CAPSULE ORAL at 16:20

## 2024-04-21 RX ADMIN — SODIUM CHLORIDE, PRESERVATIVE FREE 10 ML: 5 INJECTION INTRAVENOUS at 10:15

## 2024-04-21 RX ADMIN — GABAPENTIN 300 MG: 300 CAPSULE ORAL at 20:37

## 2024-04-21 RX ADMIN — TAMSULOSIN HYDROCHLORIDE 0.4 MG: 0.4 CAPSULE ORAL at 10:14

## 2024-04-21 RX ADMIN — SENNOSIDES AND DOCUSATE SODIUM 1 TABLET: 50; 8.6 TABLET ORAL at 10:14

## 2024-04-21 RX ADMIN — ACETAMINOPHEN 650 MG: 325 TABLET ORAL at 20:36

## 2024-04-21 ASSESSMENT — PAIN SCALES - GENERAL
PAINLEVEL_OUTOF10: 0
PAINLEVEL_OUTOF10: 2

## 2024-04-21 ASSESSMENT — PAIN DESCRIPTION - LOCATION: LOCATION: GENERALIZED;BACK

## 2024-04-21 ASSESSMENT — PAIN DESCRIPTION - ORIENTATION: ORIENTATION: LOWER

## 2024-04-21 ASSESSMENT — PAIN DESCRIPTION - DESCRIPTORS: DESCRIPTORS: ACHING;SORE

## 2024-04-22 LAB
ANION GAP SERPL CALCULATED.3IONS-SCNC: 10 MMOL/L (ref 7–16)
BUN SERPL-MCNC: 10 MG/DL (ref 6–23)
CALCIUM SERPL-MCNC: 8.8 MG/DL (ref 8.6–10.2)
CHLORIDE SERPL-SCNC: 106 MMOL/L (ref 98–107)
CO2 SERPL-SCNC: 24 MMOL/L (ref 22–29)
CREAT SERPL-MCNC: 0.8 MG/DL (ref 0.7–1.2)
ERYTHROCYTE [DISTWIDTH] IN BLOOD BY AUTOMATED COUNT: 14.4 % (ref 11.5–15)
GFR SERPL CREATININE-BSD FRML MDRD: >90 ML/MIN/1.73M2
GLUCOSE BLD-MCNC: 92 MG/DL (ref 74–99)
GLUCOSE SERPL-MCNC: 97 MG/DL (ref 74–99)
HCT VFR BLD AUTO: 35.7 % (ref 37–54)
HGB BLD-MCNC: 11 G/DL (ref 12.5–16.5)
MCH RBC QN AUTO: 29.6 PG (ref 26–35)
MCHC RBC AUTO-ENTMCNC: 30.8 G/DL (ref 32–34.5)
MCV RBC AUTO: 96 FL (ref 80–99.9)
PLATELET # BLD AUTO: 373 K/UL (ref 130–450)
PMV BLD AUTO: 8.6 FL (ref 7–12)
POTASSIUM SERPL-SCNC: 4.1 MMOL/L (ref 3.5–5)
RBC # BLD AUTO: 3.72 M/UL (ref 3.8–5.8)
SODIUM SERPL-SCNC: 140 MMOL/L (ref 132–146)
WBC OTHER # BLD: 7.6 K/UL (ref 4.5–11.5)

## 2024-04-22 PROCEDURE — 6360000004 HC RX CONTRAST MEDICATION: Performed by: INTERNAL MEDICINE

## 2024-04-22 PROCEDURE — 1200000000 HC SEMI PRIVATE

## 2024-04-22 PROCEDURE — 6370000000 HC RX 637 (ALT 250 FOR IP): Performed by: INTERNAL MEDICINE

## 2024-04-22 PROCEDURE — 2580000003 HC RX 258: Performed by: INTERNAL MEDICINE

## 2024-04-22 PROCEDURE — B2151ZZ FLUOROSCOPY OF LEFT HEART USING LOW OSMOLAR CONTRAST: ICD-10-PCS | Performed by: INTERNAL MEDICINE

## 2024-04-22 PROCEDURE — C1894 INTRO/SHEATH, NON-LASER: HCPCS | Performed by: INTERNAL MEDICINE

## 2024-04-22 PROCEDURE — 93458 L HRT ARTERY/VENTRICLE ANGIO: CPT | Performed by: INTERNAL MEDICINE

## 2024-04-22 PROCEDURE — 4A023N7 MEASUREMENT OF CARDIAC SAMPLING AND PRESSURE, LEFT HEART, PERCUTANEOUS APPROACH: ICD-10-PCS | Performed by: INTERNAL MEDICINE

## 2024-04-22 PROCEDURE — 85027 COMPLETE CBC AUTOMATED: CPT

## 2024-04-22 PROCEDURE — C1769 GUIDE WIRE: HCPCS | Performed by: INTERNAL MEDICINE

## 2024-04-22 PROCEDURE — 2500000003 HC RX 250 WO HCPCS: Performed by: INTERNAL MEDICINE

## 2024-04-22 PROCEDURE — B2111ZZ FLUOROSCOPY OF MULTIPLE CORONARY ARTERIES USING LOW OSMOLAR CONTRAST: ICD-10-PCS | Performed by: INTERNAL MEDICINE

## 2024-04-22 PROCEDURE — 36415 COLL VENOUS BLD VENIPUNCTURE: CPT

## 2024-04-22 PROCEDURE — 99222 1ST HOSP IP/OBS MODERATE 55: CPT | Performed by: INTERNAL MEDICINE

## 2024-04-22 PROCEDURE — 82962 GLUCOSE BLOOD TEST: CPT

## 2024-04-22 PROCEDURE — 6370000000 HC RX 637 (ALT 250 FOR IP): Performed by: FAMILY MEDICINE

## 2024-04-22 PROCEDURE — 2709999900 HC NON-CHARGEABLE SUPPLY: Performed by: INTERNAL MEDICINE

## 2024-04-22 PROCEDURE — 80048 BASIC METABOLIC PNL TOTAL CA: CPT

## 2024-04-22 PROCEDURE — 6360000002 HC RX W HCPCS: Performed by: INTERNAL MEDICINE

## 2024-04-22 PROCEDURE — 99233 SBSQ HOSP IP/OBS HIGH 50: CPT | Performed by: INTERNAL MEDICINE

## 2024-04-22 RX ORDER — ROSUVASTATIN CALCIUM 5 MG/1
10 TABLET, COATED ORAL DAILY
Status: DISCONTINUED | OUTPATIENT
Start: 2024-04-22 | End: 2024-04-22 | Stop reason: SDUPTHER

## 2024-04-22 RX ORDER — SODIUM CHLORIDE 9 MG/ML
INJECTION, SOLUTION INTRAVENOUS PRN
Status: DISCONTINUED | OUTPATIENT
Start: 2024-04-22 | End: 2024-04-23 | Stop reason: HOSPADM

## 2024-04-22 RX ORDER — SODIUM CHLORIDE 0.9 % (FLUSH) 0.9 %
5-40 SYRINGE (ML) INJECTION EVERY 12 HOURS SCHEDULED
Status: DISCONTINUED | OUTPATIENT
Start: 2024-04-22 | End: 2024-04-23 | Stop reason: HOSPADM

## 2024-04-22 RX ORDER — ASPIRIN 325 MG
325 TABLET ORAL ONCE
Status: COMPLETED | OUTPATIENT
Start: 2024-04-22 | End: 2024-04-22

## 2024-04-22 RX ORDER — SODIUM CHLORIDE 0.9 % (FLUSH) 0.9 %
5-40 SYRINGE (ML) INJECTION PRN
Status: DISCONTINUED | OUTPATIENT
Start: 2024-04-22 | End: 2024-04-23 | Stop reason: HOSPADM

## 2024-04-22 RX ORDER — MIDAZOLAM HYDROCHLORIDE 1 MG/ML
INJECTION INTRAMUSCULAR; INTRAVENOUS PRN
Status: DISCONTINUED | OUTPATIENT
Start: 2024-04-22 | End: 2024-04-22 | Stop reason: HOSPADM

## 2024-04-22 RX ORDER — ACETAMINOPHEN 325 MG/1
650 TABLET ORAL EVERY 4 HOURS PRN
Status: DISCONTINUED | OUTPATIENT
Start: 2024-04-22 | End: 2024-04-22 | Stop reason: SDUPTHER

## 2024-04-22 RX ORDER — ONDANSETRON 2 MG/ML
4 INJECTION INTRAMUSCULAR; INTRAVENOUS EVERY 6 HOURS PRN
Status: DISCONTINUED | OUTPATIENT
Start: 2024-04-22 | End: 2024-04-22 | Stop reason: SDUPTHER

## 2024-04-22 RX ORDER — LIDOCAINE HCL/PF 100 MG/5ML
SYRINGE (ML) INJECTION PRN
Status: DISCONTINUED | OUTPATIENT
Start: 2024-04-22 | End: 2024-04-22 | Stop reason: HOSPADM

## 2024-04-22 RX ORDER — FENTANYL CITRATE 50 UG/ML
INJECTION, SOLUTION INTRAMUSCULAR; INTRAVENOUS PRN
Status: DISCONTINUED | OUTPATIENT
Start: 2024-04-22 | End: 2024-04-22 | Stop reason: HOSPADM

## 2024-04-22 RX ADMIN — ATORVASTATIN CALCIUM 40 MG: 40 TABLET, FILM COATED ORAL at 22:19

## 2024-04-22 RX ADMIN — ASPIRIN 325 MG: 325 TABLET ORAL at 13:29

## 2024-04-22 RX ADMIN — QUETIAPINE FUMARATE 12.5 MG: 25 TABLET ORAL at 22:18

## 2024-04-22 RX ADMIN — GABAPENTIN 300 MG: 300 CAPSULE ORAL at 22:21

## 2024-04-22 RX ADMIN — SENNOSIDES AND DOCUSATE SODIUM 1 TABLET: 50; 8.6 TABLET ORAL at 10:06

## 2024-04-22 RX ADMIN — PANTOPRAZOLE SODIUM 40 MG: 40 TABLET, DELAYED RELEASE ORAL at 06:00

## 2024-04-22 RX ADMIN — GABAPENTIN 300 MG: 300 CAPSULE ORAL at 10:07

## 2024-04-22 RX ADMIN — SODIUM CHLORIDE, PRESERVATIVE FREE 10 ML: 5 INJECTION INTRAVENOUS at 10:07

## 2024-04-22 RX ADMIN — SODIUM CHLORIDE, PRESERVATIVE FREE 10 ML: 5 INJECTION INTRAVENOUS at 23:24

## 2024-04-22 RX ADMIN — Medication 3 MG: at 22:20

## 2024-04-22 RX ADMIN — ACETAMINOPHEN 650 MG: 325 TABLET ORAL at 22:19

## 2024-04-22 RX ADMIN — SODIUM CHLORIDE, PRESERVATIVE FREE 10 ML: 5 INJECTION INTRAVENOUS at 23:23

## 2024-04-22 RX ADMIN — MIRTAZAPINE 15 MG: 15 TABLET, FILM COATED ORAL at 22:18

## 2024-04-22 RX ADMIN — SODIUM CHLORIDE, PRESERVATIVE FREE 10 ML: 5 INJECTION INTRAVENOUS at 23:25

## 2024-04-22 ASSESSMENT — PAIN SCALES - GENERAL
PAINLEVEL_OUTOF10: 2
PAINLEVEL_OUTOF10: 0

## 2024-04-22 ASSESSMENT — PAIN DESCRIPTION - ORIENTATION: ORIENTATION: LOWER

## 2024-04-22 ASSESSMENT — PAIN DESCRIPTION - DESCRIPTORS: DESCRIPTORS: ACHING;DISCOMFORT;SORE

## 2024-04-22 ASSESSMENT — PAIN DESCRIPTION - LOCATION: LOCATION: BACK

## 2024-04-22 ASSESSMENT — PAIN - FUNCTIONAL ASSESSMENT: PAIN_FUNCTIONAL_ASSESSMENT: PREVENTS OR INTERFERES SOME ACTIVE ACTIVITIES AND ADLS

## 2024-04-22 NOTE — CONSULTS
CARDIOLOGY CONSULTATION  Second Opinion    Patient Name:  Noel Guerrero    :  1955    Reason for Consultation:   Abnormal nuclear stress test following history of right-sided chest discomfort and history of intracerebral hematoma and pulmonary embolism.    History of Present Illness:   Noel Guerrero presents to [facility], following a history of multiple issues relative to his thoracic lumbar spine and recent syncopal episode related to a saddle pulmonary embolism which occurred on 3/19/2024 (thought to be secondary to a prolonged flight to [place]) and underwent subsequent thrombectomy at the [facility].  He then had complications of bifrontal parenchymal cerebral hematoma and was taken off of heparin and received an IVC filter.  He was then to have appropriate anticoagulation reinstituted approximately 4 weeks from his initial discontinuance.  While at home he then developed right-sided chest discomfort since Monday of this week and finally came to the emergency room.  His chest discomfort was extremely painful and exacerbated somewhat with change in position in bed but could not recall whether he was related to respiratory inspiration.  A repeat pulmonary CT angiogram was obtained which did not demonstrate any further evidence of pulmonary embolism at this time.  Subsequently a nuclear stress test was obtained which suggested inferior wall ischemia but apparently the patient did not have recurrence of symptoms.  Likewise his troponin levels have remained at a low level.  He noted that his chest discomfort on arrival to the emergency room had improved following ingestion of 4 aspirin as he left his home.  The question now remains whether he should proceed with definitive cardiac catheterization and potential intervention at this time.    Past Medical History:  Hyperlipidemia; syncope secondary to saddle pulmonary embolism; cerebral hematoma; fracture C4 secondary to fall secondary to 
CHIEF COMPLAINT: Evaluation for subacute IPH as seen prior     HISTORY OF PRESENT ILLNESS:  Mr Cesar is a 69 y/o man  with a past medical history of hyperlipidemia, DVT, PE, lumbar degenerative disc disease necessitating  laminectomy (L2, L3, L4, and L5 in 2009 with chronic pain requiring gabapentin.  On 3/19/24 patient presented after feeling weak with a symcopal episode resulting in facial trauma and LOC along with C4 fracture necessitating cervical collar for 3 months. Patient was found to have a large saddle pulmonary embolus necessitating thrombectomy and anticoagulation. This was complicated by hemorrhagic conversion and intraparenchymal hemorrhage in bilateral frontal lobes. Patient then underwent placement of IV filter Patient was evaluated and cared for in ICU and anticoagulation to be held. Patient eventually d/c from [facility] with nsg and neurology recommending 1 month off anticoagulation.   Patient returns today with chest pain neurosurgery called for findings of recurrent IPH in bifrontal lobes as seen prior. Patient with elevated troponin and cardiology evaluating. CTA chest reveals resolution of PE.      PAST MEDICAL HISTORY:As previously documentedfrom pediatrician     PAST SURGICAL HISTORY:  As previously documented     SOCIAL HISTORY:  As previously documented       REVIEW OF SYSTEMS: As above otherwise negative      NEUROLOGICAL EXAMINATION:   Appearance                    Orbits symmetric, no rigding over sagittal, metopic, coronoal or lambdoidal suture bilaterally  Mental Status                  Awake, alert, attentive, interactive,regards light pen, follows commands  Speech                           fluent   Cranial Nerves               Pupils equal and reactive, extra-ocular muscles intact, face equal, oropharynx clear with symmetric rise, tongue midline  Motor: Moving all extremities x 4 with good bulk and tone, no rigidity noted  REFLEXES:    Symmetric, +2 bilaterally  SENSORY EXAMINATION: intact 
[facility]  Neurology Consult    Date:  4/19/2024  Patient Name:  Noel Guerrero  YOB: 1955  MRN: [id number]     PCP:  Cosme Marquez DO   Referring:  No ref. provider found      Chief Complaint: History of intracranial hemorrhage    History obtained from: Patient, spouse, chart    Assessment  Noel Guerrero is a 68 y.o. male admitted for evaluation of chest pain with a history of bilateral frontal intraparenchymal hemorrhages following a fall.      Plan  No current recommendation for antiseizure medication-reportedly had 1 acute symptomatic seizure following head injury with intracranial hemorrhage, but none since then  Discussed at length with the patient and his spouse regarding relative risks VS benefits of anticoagulation/antiplatelet agents.  Ultimately, this question will depend upon cardiology's assessment of his current cardiac status and whether more urgent intervention is needed or if it can be safely delayed for 1 to 2 weeks.        History of Present Illness:  Noel Guerrero is a 68 y.o. right handed male presenting for evaluation of intracranial hemorrhage.  The patient presented to an outside hospital on March 19, 2024 after a fall.  He has shortness of breath and was found to have a bilateral submassive PE.  He had had a fall prior to admission as well.  He underwent thrombectomy and anticoagulation, but had neurologic changes and was found to have bilateral frontal lobe hematomas.  Anticoagulation was reversed and IVC filter was placed due to DVTs.  The patient had repeat head CT scheduled for April 23 as well as follow-up with neurosurgery and neurology on the same day.    Patient is currently admitted after an episode of chest pain and he was found to have a reversible perfusion abnormality on stress test.            Medical History:   History reviewed. No pertinent past medical history.     Surgical History:   History reviewed. No pertinent surgical history.     Family 
Vascular Surgery Inpatient Consultation Note      Reason for Consultation:  Extensive DVT s/p VCF    HISTORY OF PRESENT ILLNESS:                The patient is a 68 y.o. male who is admitted to the hospital for treatment of chest pain.  He has had a complicated medical course in the last month and was hospitalized at [facility]. He experienced a syncopal event and was taken to the ED and noted to have a saddle PE and LLE DVT. Per chart review, he had thrombectomy performed for the PE and then was started on anticoagulation. He developed neurological symptoms and was noted to have b/l frontal hematomas. Anticoagulation was held and he had a VCF placed.     He states he has had BL leg swelling, L>R since before his initial diagnosis of PE/DVT. He does not feel it has changed much since when it first began a month ago. He has no pain associated to the edema and denies loss of motor or sensation or color changes to the extremities. Vascular surgery is consulted for evaluation and treatment.    IMPRESSION:  Hx of PE, BLE DVT unable to be anticoagulated due to recent brain hemorrhage    RECOMMENDATIONS:  Patient does have significant swelling to the LE without any associated pain, wounds, cellulitis or signs of phlegmasia. He states his swelling began even prior to the VCF insertion and has not changed in the acute setting; unlikely that his VCF is thrombosed given this history of no acute change in edema or pain. Regardless, due to his recent brain hemorrhage which is still being worked up with a pending MRA, he would not be a candidate for any thrombolysis procedure. I recommend anticoagulation initiation once deemed appropriate from a neuro standpoint. I also recommend supportive care with compression and leg elevation to aid in edema reduction. No plans for vascular surgical intervention.    Patient Active Problem List   Diagnosis Code    Chest pain R07.9    C4 cervical fracture (HCC) S12.300A    History of pulmonary 
department for complaint of midsternal nonradiating chest pain that started 3 days ago, he described as aching.  He also complained of shortness of breath.  ED > 4/15/2024, 2359, via EMS complaint of chest pain  Arrival vitals: T98.4, RR 16, P89, /74, SpO2 99% on room air.   Significant Labs: , K4.0, BUN 17, CR 0.9, GFR greater than 90, mag 2.2, glucose 126, albumin 3.5, alk phos 295, , , bilirubin 0.9, WBCs 10.9, Hgb 10.5, HCT 33.2, , PT 13.3, INR 1.2  proBNP 107  Troponin 15/16  RAD:   CT HEAD WO CONTRAST   Final Result   Subacute bilateral inferior frontal parenchymal hemorrhagic contusions with   moderate surrounding vasogenic edema and adjacent mass effect/sulcal   effacement. This is consistent with given history of previous intracranial   hemorrhage, however, the abnormality was not present on prior examination of   March 19, 2024.       RECOMMENDATIONS:   Careful clinical correlation and follow up recommended.           CTA PULMONARY W CONTRAST   Final Result   No evidence of pulmonary embolism or acute pulmonary abnormality.  Resolution   of previously demonstrated pulmonary emboli noted.           XR CHEST PORTABLE   Final Result   Coarsened interstitial markings.  Atherosclerotic disease.  No acute   cardiopulmonary pathology seen.     ED treatment: Tylenol 650 mg oral, Lasix 20 mg oral, Neurontin 300 mg oral, Protonix 40 mg oral, Flomax 0.4 mg oral.  ED diagnosis: Chest pain, unspecified type    Patient seen and examined.  Recent vitals: T98.4, RR 16, P78, /70, SpO2 98% on room air.  On arrival to his room, he was resting in bed in no apparent distress.  He is alert and oriented x 3.  His wife is on the telephone via speaker phone and assisted with all of his medical history.  VSS.  Patient currently denies any chest pain, palpitations or shortness of breath.  He denies any recent weight gain, orthopnea, or PND.  He does report some ongoing lower leg swelling since 
walking at my own pace.   3 I stop for breath after walking about 100 yards or after a few minutes on level ground.   4 I am too breathless to leave the house or I am breathless when dressing.     Smoking history-never smoker    Occupational exposure/ Pet/bird -  No history of exposure to any occupational Pneumotoxins.     Age appropriate Cancer screening-   Patient is up to date on age appropriate cancer screening and immunizations.      Past Medical History   History reviewed. No pertinent past medical history.    Past Surgical History  History reviewed. No pertinent surgical history.    Allergies  Allergies   Allergen Reactions    Cats Claw (Uncaria Tomentosa)     Dust Mite Extract        Medications  Current Facility-Administered Medications   Medication Dose Route Frequency Provider Last Rate Last Admin    sodium chloride flush 0.9 % injection 5-40 mL  5-40 mL IntraVENous 2 times per day Mani Reilly DO   10 mL at 04/22/24 1007    sodium chloride flush 0.9 % injection 5-40 mL  5-40 mL IntraVENous PRN Mani Reilly DO        0.9 % sodium chloride infusion   IntraVENous PRN Mani Reilly DO        metoprolol (LOPRESSOR) injection 10 mg  10 mg IntraVENous Once PRN Kristopher Amaya MD        metoprolol succinate (TOPROL XL) extended release tablet 25 mg  25 mg Oral Daily Vance Sharma MD   25 mg at 04/18/24 0904    atorvastatin (LIPITOR) tablet 40 mg  40 mg Oral Nightly Vance Sharma MD   40 mg at 04/21/24 2036    nitroGLYCERIN (NITROSTAT) SL tablet 0.4 mg  0.4 mg SubLINGual Q5 Min PRN Vance Sharma MD        benzocaine-menthol (CEPACOL SORE THROAT) lozenge 1 lozenge  1 lozenge Oral Q2H PRN Della Nayak, DO        benzonatate (TESSALON) capsule 100 mg  100 mg Oral TID PRN Della Nayak DO        hydrALAZINE (APRESOLINE) injection 10 mg  10 mg IntraVENous Q6H PRN Della Nayak DO        melatonin tablet 3 mg  3 mg Oral Nightly PRN Della Nayak DO   3 mg at

## 2024-04-22 NOTE — CARE COORDINATION
4/22/24, Patient is to have Cath today.  Discharge plan remains to [facility].  Patient will need a precert to return back to the facility.  Dee in CM department has re-submitted to the insurance precert last Friday.  Dee to check with insurance today on precert.  Deja from [facility] has been updated.  Ambulance/ambulette form (will need completed on day of discharge), face sheet and envelope is on soft chart.  SW to follow.      Mari Yoon ZAHRA  [facility] Case Management  [phone]

## 2024-04-23 VITALS
WEIGHT: 228.18 LBS | RESPIRATION RATE: 18 BRPM | DIASTOLIC BLOOD PRESSURE: 69 MMHG | OXYGEN SATURATION: 91 % | HEART RATE: 84 BPM | HEIGHT: 73 IN | SYSTOLIC BLOOD PRESSURE: 125 MMHG | BODY MASS INDEX: 30.24 KG/M2 | TEMPERATURE: 98.1 F

## 2024-04-23 LAB
ANION GAP SERPL CALCULATED.3IONS-SCNC: 11 MMOL/L (ref 7–16)
BUN SERPL-MCNC: 13 MG/DL (ref 6–23)
CALCIUM SERPL-MCNC: 8.6 MG/DL (ref 8.6–10.2)
CHLORIDE SERPL-SCNC: 110 MMOL/L (ref 98–107)
CO2 SERPL-SCNC: 21 MMOL/L (ref 22–29)
CREAT SERPL-MCNC: 0.8 MG/DL (ref 0.7–1.2)
ECHO BSA: 2.27 M2
GFR SERPL CREATININE-BSD FRML MDRD: >90 ML/MIN/1.73M2
GLUCOSE SERPL-MCNC: 113 MG/DL (ref 74–99)
POTASSIUM SERPL-SCNC: 3.9 MMOL/L (ref 3.5–5)
SODIUM SERPL-SCNC: 142 MMOL/L (ref 132–146)

## 2024-04-23 PROCEDURE — 80048 BASIC METABOLIC PNL TOTAL CA: CPT

## 2024-04-23 PROCEDURE — 2580000003 HC RX 258: Performed by: INTERNAL MEDICINE

## 2024-04-23 PROCEDURE — 6370000000 HC RX 637 (ALT 250 FOR IP): Performed by: INTERNAL MEDICINE

## 2024-04-23 PROCEDURE — 99233 SBSQ HOSP IP/OBS HIGH 50: CPT | Performed by: INTERNAL MEDICINE

## 2024-04-23 PROCEDURE — 99239 HOSP IP/OBS DSCHRG MGMT >30: CPT | Performed by: INTERNAL MEDICINE

## 2024-04-23 PROCEDURE — 6370000000 HC RX 637 (ALT 250 FOR IP): Performed by: FAMILY MEDICINE

## 2024-04-23 PROCEDURE — 36415 COLL VENOUS BLD VENIPUNCTURE: CPT

## 2024-04-23 RX ORDER — BENZONATATE 100 MG/1
100 CAPSULE ORAL 3 TIMES DAILY PRN
DISCHARGE
Start: 2024-04-23 | End: 2024-04-30

## 2024-04-23 RX ORDER — METOPROLOL SUCCINATE 25 MG/1
25 TABLET, EXTENDED RELEASE ORAL DAILY
Qty: 30 TABLET | Refills: 3 | DISCHARGE
Start: 2024-04-23

## 2024-04-23 RX ORDER — ATORVASTATIN CALCIUM 40 MG/1
40 TABLET, FILM COATED ORAL NIGHTLY
Qty: 30 TABLET | Refills: 3 | DISCHARGE
Start: 2024-04-23

## 2024-04-23 RX ORDER — FUROSEMIDE 20 MG/1
20 TABLET ORAL DAILY
Qty: 60 TABLET | Refills: 3 | DISCHARGE
Start: 2024-04-23

## 2024-04-23 RX ADMIN — GABAPENTIN 300 MG: 300 CAPSULE ORAL at 15:15

## 2024-04-23 RX ADMIN — METOPROLOL SUCCINATE 25 MG: 25 TABLET, EXTENDED RELEASE ORAL at 10:22

## 2024-04-23 RX ADMIN — TAMSULOSIN HYDROCHLORIDE 0.4 MG: 0.4 CAPSULE ORAL at 10:22

## 2024-04-23 RX ADMIN — FUROSEMIDE 20 MG: 20 TABLET ORAL at 10:22

## 2024-04-23 RX ADMIN — PANTOPRAZOLE SODIUM 40 MG: 40 TABLET, DELAYED RELEASE ORAL at 05:50

## 2024-04-23 RX ADMIN — GABAPENTIN 300 MG: 300 CAPSULE ORAL at 10:22

## 2024-04-23 RX ADMIN — Medication 1000 UNITS: at 10:22

## 2024-04-23 RX ADMIN — SODIUM CHLORIDE, PRESERVATIVE FREE 10 ML: 5 INJECTION INTRAVENOUS at 10:23

## 2024-04-23 RX ADMIN — SENNOSIDES AND DOCUSATE SODIUM 1 TABLET: 50; 8.6 TABLET ORAL at 10:22

## 2024-04-23 NOTE — DISCHARGE SUMMARY
hematomas in the medial inferior frontal lobes, larger on the left than the right, consistent with hemorrhagic contusions. These do not produce significant mass effect, with no effacement of the adjacent frontal horns.  There is mild edema anteriorly, especially on the left.. The ventricles and sulci are normal in size and configuration.  The sellar/suprasellar regions appear unremarkable.  The normal signal voids within the major intracranial vessels appear maintained. ORBITS: The visualized portion of the orbits demonstrate no acute abnormality. SINUSES: There is a moderate air-fluid level in the dominant left sphenoid sinus from acute sinusitis.  The rest of the visualized paranasal sinuses and mastoids are clear. BONES/SOFT TISSUES: The bone marrow signal intensity appears normal. The soft tissues demonstrate no acute abnormality.     1. Large subacute hematomas in the medial inferior frontal lobes, larger on the left than the right, consistent with hemorrhagic contusions. These do not produce significant mass effect, with no effacement of the adjacent frontal horns. 2. Normal appearance of the rest of the brain. 3. Moderate air-fluid level in the dominant left sphenoid sinus from acute sinusitis.     MRA HEAD WO CONTRAST    Result Date: 4/17/2024  EXAMINATION: MRA OF THE HEAD WITHOUT CONTRAST 4/17/2024 8:04 pm TECHNIQUE: MRA of the head was performed utilizing time-of-flight imaging with MIP images. No intravenous contrast was administered. COMPARISON: CT of the head from yesterday.  MRI of the brain from today. HISTORY: ORDERING SYSTEM PROVIDED HISTORY: cerebral edema, recent brain bleed TECHNOLOGIST PROVIDED HISTORY: Reason for exam:->cerebral edema, recent brain bleed What reading provider will be dictating this exam?->CRC FINDINGS: ANTERIOR CIRCULATION: No significant stenosis of the intracranial internal carotid, anterior cerebral, or middle cerebral arteries.  The anterior communicating artery is patent.

## 2024-04-23 NOTE — CARE COORDINATION
04/23/24 Update CM Note: \[facility]\"" ambulance set up to  patient via stretcher for transport to the [facility]. Paperwork done and in envelope. Unit staff notified via phone,. Electronically signed by Sandra Espinal RN CM on 4/23/2024 at 3:37 PM

## 2024-04-23 NOTE — CARE COORDINATION
4/23/24, Discharge Acknowledged.  Patient is a return back to [facility] and will need precert which is pending.  Transportation forms are on the soft chart and will need competed on day of discharge.  Face sheet and envelope is on soft chart.  SW to follow.BRITTANI Shipman  [facility] Case Management  [phone]

## 2024-04-23 NOTE — DISCHARGE INSTRUCTIONS
Discharge to:  [facility]  Diet: regular  Activity: activity as tolerated   Exercise: As tolerated     Be compliant with medication  Follow up with PCP.   Follow up with Pulmonology in 3 months for repeat imaging and testing.   Follow up with neurosurgery for c-collar, removal.   Follow up with Cardiology, Dr. Reilly.

## 2024-04-23 NOTE — DISCHARGE INSTR - COC
Continuity of Care Form    Patient Name: Noel Guerrero   :  1955  MRN:  [id number]    Admit date:  2024  Discharge date:  2024    Code Status Order: Full Code   Advance Directives:     Admitting Physician:  Martin Shell MD  PCP: Cosme Marquez DO    Discharging Nurse: Jordyn Swenson RN  Discharging Hospital Unit/Room#: 8211/8211-A  Discharging Unit Phone Number: [phone]    Emergency Contact:   Extended Emergency Contact Information  Primary Emergency Contact: MADY SANTOS  Mobile Phone: [phone]  Relation: Spouse   needed? No    Past Surgical History:  Past Surgical History:   Procedure Laterality Date    CARDIAC PROCEDURE N/A 2024    Left heart cath / coronary angiography [place] Mani Reilly [place] [facility] [place] [facility]       Immunization History:   Immunization History   Administered Date(s) Administered    COVID-19, MODERNA, ( formula), (age 12y+), IM, 50mcg/0.5mL 2023       Active Problems:  Patient Active Problem List   Diagnosis Code    Chest pain R07.9    C4 cervical fracture ([facility]) S12.300A    History of pulmonary embolism Z86.711    DVT (deep venous thrombosis) ([facility]) I82.409    ICH (intracerebral hemorrhage) ([facility]) I61.9    Acute deep vein thrombosis (DVT) of both iliofemoral veins ([facility]) I82.423    Cerebral edema ([facility]) G93.6    Leg edema R60.0       Isolation/Infection:   Isolation            No Isolation          Patient Infection Status       None to display                     Nurse Assessment:  Last Vital Signs: /67   Pulse 85   Temp 98.1 °F (36.7 °C) (Temporal)   Resp 18   Ht 1.854 m (6' 1\")   Wt 103.5 kg (228 lb 2.8 oz)   SpO2 98%   BMI 30.10 kg/m²     Last documented pain score (0-10 scale): Pain Level: 0  Last Weight:   Wt Readings from Last 1 Encounters:   24 103.5 kg (228 lb 2.8 oz)     Mental Status:  oriented and alert    IV Access:  - None    Nursing Mobility/ADLs:  Walking   Assisted  Transfer

## 2024-04-23 NOTE — PLAN OF CARE
Problem: Pain  Goal: Verbalizes/displays adequate comfort level or baseline comfort level  Outcome: Adequate for Discharge     Problem: Skin/Tissue Integrity  Goal: Absence of new skin breakdown  Description: 1.  Monitor for areas of redness and/or skin breakdown  2.  Assess vascular access sites hourly  3.  Every 4-6 hours minimum:  Change oxygen saturation probe site  4.  Every 4-6 hours:  If on nasal continuous positive airway pressure, respiratory therapy assess nares and determine need for appliance change or resting period.  Outcome: Adequate for Discharge     Problem: Safety - Adult  Goal: Free from fall injury  Outcome: Adequate for Discharge     Problem: Chronic Conditions and Co-morbidities  Goal: Patient's chronic conditions and co-morbidity symptoms are monitored and maintained or improved  Outcome: Adequate for Discharge

## 2024-04-23 NOTE — PROGRESS NOTES
[facility]        CARDIOLOGY                 INPATIENT PROGRESS NOTE          PATIENT SEEN IN FOLLOW UP FOR: Elevated Troponin    Hospital Day: 3     Noel Guerrero is a 68 year old patient previously not known to [facility] cardiology. Seen by [facility] cardiologist Dr. Steve Landeros s/p thrombectomy.  Scheduled with Dr. Amauri Nguyễn on 5-6-2024 for follow-up.       SUBJECTIVE: Denies any CP or SOB. No orthopnea. No distress. C/o 'dark urine\"  Yesterday he wanted second opinion regarding his stress test results and further testing.  Dr Reilly consulted and he recommended \"would consider a coronary CT angiogram to determine the significance of his underlying coronary artery disease from an anatomical standpoint and if truly present and not a false positive nuclear stress test would treat medically over the next 4 weeks until he is able to take appropriate anticoagulation if needed\" .      ROS: Review of rest of 10 systems negative except as mentioned above    OBJECTIVE: No acute distress.  See Assessment     Diagnostics:       Telemetry: Reviewed    MRI head/Brain - 4/17/24 - Noted.  Large subacute hematomas in the medial inferior frontal lobes, larger on  the left than the right, consistent with hemorrhagic contusions.    Stress test 4/16/24    Stress Test: A pharmacological stress test was performed using regadenoson (Lexiscan).    Stress ECG: The stress ECG was negative for ischemia.    Stress Function: Normal left ventricle size. Left ventricular function is normal. Ejection fraction is 75%.    Perfusion Comments: Moderate-sized reversible perfusion defect in the inferior wall.    U/S lower Ext: 4/16/24  IMPRESSION:  1. Combination of occlusive and nonocclusive thrombus extending from the  right iliac vein to the calf veins.  2. Combination of occlusive and nonocclusive thrombus extending from the left  iliac vein to the calf veins.    CTA chest: 4/16/24  IMPRESSION:  No evidence of pulmonary 
               [facility]        CARDIOLOGY                 INPATIENT PROGRESS NOTE          PATIENT SEEN IN FOLLOW UP FOR: Elevated Troponin    Hospital Day: 2     Noel Guerrero is a 68 year old patient previously not known to [facility] cardiology. Seen by [facility] cardiologist Dr. Steve Landeros s/p thrombectomy.  Scheduled with Dr. Amauri Nguyễn on 5-6-2024 for follow-up.       SUBJECTIVE: Denies any CP or SOB. No orthopnea. Seen by Neurosurgery. No fever. No palpitations.    ROS: Review of rest of 10 systems negative except as mentioned above    OBJECTIVE: No acute distress.  See Assessment     Diagnostics:       Telemetry: Reviewed    Stress test 4/16/24    Stress Test: A pharmacological stress test was performed using regadenoson (Lexiscan).    Stress ECG: The stress ECG was negative for ischemia.    Stress Function: Normal left ventricle size. Left ventricular function is normal. Ejection fraction is 75%.    Perfusion Comments: Moderate-sized reversible perfusion defect in the inferior wall.    U/S lower Ext: 4/16/24  IMPRESSION:  1. Combination of occlusive and nonocclusive thrombus extending from the  right iliac vein to the calf veins.  2. Combination of occlusive and nonocclusive thrombus extending from the left  iliac vein to the calf veins.    CTA chest: 4/16/24  IMPRESSION:  No evidence of pulmonary embolism or acute pulmonary abnormality.  Resolution  of previously demonstrated pulmonary emboli noted.    U/S abdomen: 4/16/24  IMPRESSION:  Cholelithiasis and echogenic debris within the gallbladder. No gallbladder  wall thickening or pericholecystic fluid. Negative sonographic Hidalgo's sign.    TTE 3/27/2024 at [facility]:    Left Ventricle: Left ventricle size is normal. Mildly increased wall   thickness. Mild septal thickening. Mass index 2D is 119.6 g/m2. Findings   consistent with mild concentric hypertrophy. Normal left ventricular   systolic function. EF by 2D Simpsons Biplane is 57%. Normal 
       [facility] Hospitalist Progress Note    Admitting Date and Time: 4/16/2024 12:11 AM  Admit Dx: Leg edema [R60.0]  Chest pain [R07.9]  Chest pain, unspecified type [R07.9]  Noel Guerrero is a 68 y.o. male patient of [facility] with history of hyperlipidemia, recent PE, intraparenchymal hematoma, DVT presented to [facility] on 4/16/2024 with complaints of chest pain that resolved with aspirin and nitro.  Patient was admitted for cardiology evaluation.     Of note, 3/19 he recently had syncopal episode which caused a fall and he was found to have saddle PE.  He was anticoagulated and underwent thrombectomy on 3/9 but his course was complicated due to intraparenchymal hematoma, so IVC filter was placed.  He was discharged 3/25.  He was readmitted 3/27 and found to have UTI and Jae with recurrence of pulmonary embolism.  Initial plan was to start anticoagulation after 4 weeks from initial ICH that is from 4/20/2024 according to [facility] notes.     Subjective:  Patient is being followed for Leg edema [R60.0]  Chest pain [R07.9]  Chest pain, unspecified type [R07.9]   Patient seen and examined.  N.p.o. for Cath Lab today.  Family bedside.  No new complaints or concerns.    ROS: denies fever, chills, cp, sob, n/v, HA unless stated above.      sodium chloride flush  5-40 mL IntraVENous 2 times per day    sodium chloride flush  5-40 mL IntraVENous 2 times per day    metoprolol succinate  25 mg Oral Daily    atorvastatin  40 mg Oral Nightly    sodium chloride flush  5-40 mL IntraVENous 2 times per day    furosemide  20 mg Oral Daily    gabapentin  300 mg Oral TID    mirtazapine  15 mg Oral Nightly    pantoprazole  40 mg Oral QAM AC    QUEtiapine  12.5 mg Oral Nightly    Vitamin D  1,000 Units Oral Daily    sennosides-docusate sodium  1 tablet Oral Daily    tamsulosin  0.4 mg Oral Daily     sodium chloride flush, 5-40 mL, PRN  sodium chloride, , PRN  sodium chloride flush, 5-40 mL, 
     Pulmonary Critical Care Medicine           PULMONARY  CRITICAL CARE   SERVICE DAILY PROGRESS  NOTE     2024   Hospital LOS:  LOS: 4 days     Impression / Recommendations:     Acute B/L DVT and PE s/p thrombectomy and TPA followed by development of IC hematoma S/p IVC filter placement      Recommendations-   - LHC - non obstructive   - Neurology and neurosurgery following   - Will need 3 month CT chest, VQ scan and repeat ECHO followed by out patiennt follow up with Dr. Harden   - Initiation of anticoagulation as per neurosurgery when safe . It's been 5 weeks since the onset of Bleed.  -Continue and wean supplemental oxygen as tolerated  -PT/OT and out of bed to chair and ambulation as permitted by other consulting services  - Ok from pulmonary standpoint to be discharged to a facility   - Use compression stockings on B/l LE       Interval History/Event Changes:  No new complains   Remains on C- collar   Waiting for pre-certification for facility transfer   LE awelling persists     Allergies  Allergies   Allergen Reactions    Cats Claw (Uncaria Tomentosa)     Dust Mite Extract        Review of Systems    A pertinent review of systems was performed and was otherwise non-contributory.    Vitals-     /73   Pulse 91   Temp 97.5 °F (36.4 °C) (Temporal)   Resp 18   Ht 1.854 m (6' 1\")   Wt 103.5 kg (228 lb 2.8 oz)   SpO2 94%   BMI 30.10 kg/m²    Tmax: Temp (24hrs), Av.6 °F (36.4 °C), Min:96.9 °F (36.1 °C), Max:99 °F (37.2 °C)      Hemodynamics:  Cuff:   Systolic (24hrs), Av , Min:96 , Max:111   /Diastolic (24hrs), Av, Min:54, Max:73    Cuff MAP:MAP (mmHg)  Av.7  Min: 36  Max: 200  P:   Pulse  Av  Min: 71  Max: 91      Airway:     Observed RR: Resp  Avg: 15.8  Min: 12  Max: 18   Observed O2 sats: SpO2  Av.1 %  Min: 79 %  Max: 100 %      Intake/Output Summary (Last 24 hours) at 2024 1119  Last data filed at 2024 1432  Gross per 24 hour   Intake --   Output 10 ml 
  OCCUPATIONAL THERAPY INITIAL EVALUATION    [facility]  [address], [place]        Date:2024                                                  Patient Name: Noel Guerrero    MRN: [id number]    : 1955    Room: [address] [place] [address]      Evaluating OT: Velia Mendoza OTR/L; [id number]       Referring Provider: Vu Hernandez MD     Specific Provider Orders/Date: OT Eval and Treat 24       Diagnosis: Chest pain; ICH (intracerebral hemorrhage); BLE DVT; History of pulmonary embolism     Surgery: None this admission     Pertinent Medical History:  has no past medical history on file.     Recommended Adaptive Equipment: TBD     Precautions:  Fall Risk, +alarms, TSM, cognition, c-spine precautions, c-collar (c4 fx)     Assessment of current deficits    [x] Functional mobility  [x]ADLs  [x] Strength               [x]Cognition    [x] Functional transfers   [x] IADLs         [x] Safety Awareness   [x]Endurance    [x] Fine Coordination              [x] Balance      [] Vision/perception   []Sensation     []Gross Motor Coordination  [] ROM  [] Delirium                   [] Motor Control     OT PLAN OF CARE   OT POC based on physician orders, patient diagnosis and results of clinical assessment    Frequency/Duration 2-4 days/wk for 2 weeks PRN   Specific OT Treatment Interventions to include:   * Instruction/training on adapted ADL techniques and AE recommendations to increase functional independence within precautions       * Training on energy conservation strategies, correct breathing pattern and techniques to improve independence/tolerance for self-care routine  * Functional transfer/mobility training/DME recommendations for increased independence, safety, and fall prevention  * Patient/Family education to increase follow through with safety techniques and functional independence  * Recommendation of environmental modifications for increased 
 Noel Guerrero is a 68 y.o.  male     Patient presented to an outside hospital on March 19, 2024 after he fell.  He was having some shortness of breath fell and was found to have bilateral PEs.  He underwent thrombectomy and anticoagulation but had neurological changes and was found to have bilateral frontal lobe hematomas.  Anticoagulation was reversed and IVC filter was placed due to his DVTs.   Patient had repeat head CT  Scheduled for April 23 and follow-up with neurosurgery and neurology and Greig.    Neurology was consulted for evaluation for OAC/AP    Allergies as of 04/15/2024 - Fully Reviewed 03/27/2024   Allergen Reaction Noted    Cats claw (uncaria tomentosa)  03/19/2024    Dust mite extract  03/19/2024     Objective:     BP 94/60   Pulse 72   Temp 97.7 °F (36.5 °C) (Temporal)   Resp 18   Ht 1.854 m (6' 1\")   Wt 99.8 kg (220 lb)   SpO2 99%   BMI 29.03 kg/m²      General appearance: alert, appears stated age and cooperative  Head: Normocephalic, without obvious abnormality, atraumatic  Extremities: no cyanosis or edema  Pulses: 2+ and symmetric  Skin: no rashes or lesions    Mental Status: Alert, oriented, thought content appropriate    Speech: clear  Language: appropriate but laconic    Cranial Nerves:  I: smell    II: visual acuity     II: visual fields Full   II: pupils TAIWO   III,VII: ptosis None   III,IV,VI: extraocular muscles  EOMI without nystagmus    V: mastication Normal   V: facial light touch sensation  Normal   V,VII: corneal reflex  Present   VII: facial muscle function - upper     VII: facial muscle function - lower Normal   VIII: hearing Normal   IX: soft palate elevation  Normal   IX,X: gag reflex    XI: trapezius strength  5/5   XI: sternocleidomastoid strength 5/5   XI: neck extension strength  5/5   XII: tongue strength  Normal     Motor:  5/5 throughout  Normal bulk and tone    Sensory:  Normal to LT     Coordination:   FN symmetrical without ataxia    DTR:   No reflexes    No 
4 Eyes Skin Assessment     NAME:  Noel Guerrero  YOB: 1955  MEDICAL RECORD NUMBER:  [id number]    The patient is being assessed for  Admission    I agree that at least one RN has performed a thorough Head to Toe Skin Assessment on the patient. ALL assessment sites listed below have been assessed.      Areas assessed by both nurses:    Head, Face, Ears, Shoulders, Back, Chest, Arms, Elbows, Hands, Sacrum. Buttock, Coccyx, Ischium, Legs. Feet and Heels, and Under Medical Devices         Does the Patient have a Wound? No noted wound(s)       Tra Prevention initiated by RN: No  Wound Care Orders initiated by RN: No    Pressure Injury (Stage 3,4, Unstageable, DTI, NWPT, and Complex wounds) if present, place Wound referral order by RN under : No    New Ostomies, if present place, Ostomy referral order under : No     Nurse 1 eSignature: Electronically signed by SAMANTHA CLARK RN on 4/16/24 at 7:36 PM EDT    **SHARE this note so that the co-signing nurse can place an eSignature**    Nurse 2 eSignature: Electronically signed by Deedee Cunha RN on 4/16/24 at 7:37 PM EDT  
Against advice of staff due to patient not being able to fully walk per wife. Bed alarm found going off, friend found walking to bathroom room with patient in arms and stated \" were not bullshitting around\". Walker and bedside commode were ordered STAT for patient. Family and patient were educated of safety risks and potential harm that could have been done if patient is unable to walk properly which is why the staff has measures such as the bed pan and urinal. Wife and friend refused and patient was taken out of bed by them against better judgement.  
Call placed to angio to relay Dr. Amaya is requesting that patient be sent to CT as soon as available and relayed other new orders.   
Clarified diet with patient. States he does not usually have swallowing issues with collar- and will be able to order from menu what is easiest to chew.   
Dr. Amaya notified of patient's most recent vitals. Orders received.   
I saw and examined the patient.  He was seen in initial consultation by Dr. Sharma.  He sought another opinion with Dr. Reilly.  He wants to follow with Dr. Reilly.  [facility] cardiology will sign off.  Please call if needed    Electronically signed by Junaid Coffman MD on 4/20/2024 at 2:06 PM    
Just returned form Radiology - Had coronary CTA done - Results pending    Dr Coffman will see him this weekend and discuss test results when available.    Vance Sharma MD  4/19/2024  5:55 PM  
Neurology consult sent via Anki.  
Neurosurgery consult called in to Dr Ibrahim via PS. Message left.   
PROGRESS NOTE       PATIENT PROBLEM LIST:  Patient Active Problem List   Diagnosis Code    Chest pain R07.9    C4 cervical fracture ([facility]) S12.300A    History of pulmonary embolism Z86.711    DVT (deep venous thrombosis) ([facility]) I82.409    ICH (intracerebral hemorrhage) ([facility]) I61.9    Acute deep vein thrombosis (DVT) of both iliofemoral veins ([facility]) I82.423    Cerebral edema ([facility]) G93.6    Leg edema R60.0       SUBJECTIVE:  Noel Guerrero states he is quite frustrated with his present clinical status but has not experienced any further chest discomfort nor significant shortness of breath but likewise he has not ambulated.    REVIEW OF SYSTEMS:  General ROS: negative for - fatigue, malaise,  weight gain or weight loss  Psychological ROS: negative for - anxiety , depression  Ophthalmic ROS: negative for - decreased vision or visual distortion.  ENT ROS: negative  Allergy and Immunology ROS: negative  Hematological and Lymphatic ROS: negative  Endocrine: no heat or cold intolerance and no polyphagia, polydipsia, or polyuria  Respiratory ROS: negative for - cough, hemoptysis, pleuritic pain, and shortness of breath  Cardiovascular ROS: positive for - chest pain.  Gastrointestinal ROS: no abdominal pain, change in bowel habits, or black or bloody stools  Genito-Urinary ROS: no nocturia, dysuria, trouble voiding, frequency or hematuria  Musculoskeletal ROS: negative for- myalgias, arthralgias, or claudication  Neurological ROS: no TIA or stroke symptoms otherwise no significant change in symptoms or problems since yesterday as documented in previous progress notes.    SCHEDULED MEDICATIONS:   metoprolol succinate  25 mg Oral Daily    atorvastatin  40 mg Oral Nightly    sodium chloride flush  5-40 mL IntraVENous 2 times per day    furosemide  20 mg Oral Daily    gabapentin  300 mg Oral TID    mirtazapine  15 mg Oral Nightly    pantoprazole  40 mg Oral QAM AC    QUEtiapine  12.5 mg Oral Nightly    Vitamin D  1,000 Units Oral Daily 
PROGRESS NOTE       PATIENT PROBLEM LIST:  Patient Active Problem List   Diagnosis Code    Chest pain R07.9    C4 cervical fracture ([facility]) S12.300A    History of pulmonary embolism Z86.711    DVT (deep venous thrombosis) ([facility]) I82.409    ICH (intracerebral hemorrhage) ([facility]) I61.9    Acute deep vein thrombosis (DVT) of both iliofemoral veins ([facility]) I82.423    Cerebral edema ([facility]) G93.6    Leg edema R60.0       SUBJECTIVE:  Noel Guerrero is now post cardiac catheterization and voices no complaints of discomfort from his catheterization site nor chest discomfort.     REVIEW OF SYSTEMS:  General ROS: negative for - fatigue, malaise,  weight gain or weight loss  Psychological ROS: positive for - anxiety , depression.  positive for-frustration  Ophthalmic ROS: negative for - decreased vision or visual distortion.  ENT ROS: negative  Allergy and Immunology ROS: negative  Hematological and Lymphatic ROS: negative  Endocrine: no heat or cold intolerance and no polyphagia, polydipsia, or polyuria  Respiratory ROS: negative for - cough, hemoptysis, pleuritic pain, and shortness of breath  Cardiovascular ROS: positive for - chest pain.  Gastrointestinal ROS: no abdominal pain, change in bowel habits, or black or bloody stools  Genito-Urinary ROS: no nocturia, dysuria, trouble voiding, frequency or hematuria  Musculoskeletal ROS: positive for- myalgias, neck and left lower extremity discomfort but no significant arthralgias, or claudication  Neurological ROS: no TIA or stroke symptoms otherwise no significant change in symptoms or problems since yesterday as documented in previous progress notes.    SCHEDULED MEDICATIONS:   sodium chloride flush  5-40 mL IntraVENous 2 times per day    sodium chloride flush  5-40 mL IntraVENous 2 times per day    metoprolol succinate  25 mg Oral Daily    atorvastatin  40 mg Oral Nightly    sodium chloride flush  5-40 mL IntraVENous 2 times per day    furosemide  20 mg Oral Daily    gabapentin  300 mg 
PROGRESS NOTE       PATIENT PROBLEM LIST:  Patient Active Problem List   Diagnosis Code    Chest pain R07.9    C4 cervical fracture ([facility]) S12.300A    History of pulmonary embolism Z86.711    DVT (deep venous thrombosis) ([facility]) I82.409    ICH (intracerebral hemorrhage) ([facility]) I61.9    Acute deep vein thrombosis (DVT) of both iliofemoral veins ([facility]) I82.423    Cerebral edema ([facility]) G93.6    Leg edema R60.0       SUBJECTIVE:  Noel Guerrero feels somewhat more comfortable this evening but is looking forward to undergoing cardiac catheterization tomorrow with the hope of delineating his coronary artery anatomy and helping him to move forward globally.    REVIEW OF SYSTEMS:  General ROS: negative for - fatigue, malaise,  weight gain or weight loss  Psychological ROS: negative for - anxiety , depression.  positive for-frustration  Ophthalmic ROS: negative for - decreased vision or visual distortion.  ENT ROS: negative  Allergy and Immunology ROS: negative  Hematological and Lymphatic ROS: negative  Endocrine: no heat or cold intolerance and no polyphagia, polydipsia, or polyuria  Respiratory ROS: negative for - cough, hemoptysis, pleuritic pain, and shortness of breath  Cardiovascular ROS: positive for - chest pain.  Gastrointestinal ROS: no abdominal pain, change in bowel habits, or black or bloody stools  Genito-Urinary ROS: no nocturia, dysuria, trouble voiding, frequency or hematuria  Musculoskeletal ROS: negative for- myalgias, arthralgias, or claudication  Neurological ROS: no TIA or stroke symptoms otherwise no significant change in symptoms or problems since yesterday as documented in previous progress notes.    SCHEDULED MEDICATIONS:   metoprolol succinate  25 mg Oral Daily    atorvastatin  40 mg Oral Nightly    sodium chloride flush  5-40 mL IntraVENous 2 times per day    furosemide  20 mg Oral Daily    gabapentin  300 mg Oral TID    mirtazapine  15 mg Oral Nightly    pantoprazole  40 mg Oral QAM AC    QUEtiapine  12.5 
Patient wants to move forward with Dr. Reilly for Cardiology services.  Dr. Reilly consulted and notified of consult, will see patient today and review his CTA results with him.    
Patient was seen by my partner earlier this morning.  Labs, vitals, images and medications reviewed.  Presented with chest pain resolved with aspirin.    Cardiology has been consulted to see if he will benefit from stress test.  Of note given LFTs being elevated patient has also been ordered an abdominal ultrasound to follow-up on result.  He is status post thrombectomy done for PE, lower extremity venous duplex ordered given patient's complaint of lower extremity swelling per patient's request.  Follow-up on result.      CT head reviewed which was presented to cranial hemorrhage did make mention of subacute bilateral inferior frontal lobe parenchymal hemorrhagic contusions with moderate surrounding for genic edema and adjacent mass effect and sulcal effacement, though consistent with history of intracranial hemorrhage abnormality was not present on previous exam.    Neurosurgery, pulmonology and vascular surgery consulted for recommendations on anticoagulation with concern for cerebral edema.  
Per physician ambulance  is delayed to between 10-11pm tonight.    
Physical Therapy Initial Evaluation    Name: Noel Guerrero  : 1955  MRN: [id number]      Date of Service: 2024    Evaluating PT:  Mansoor Jay, PT [id number]    Referring provider/PT Order:  PT Eval and Treat   24  PT eval and treat  Start:  24,   End:  24,   ONE TIME,   Standing Count:  1 Occurrences,   R         Vu Hernandez MD     Room #:  8211/8211-A  Diagnosis:  Leg edema [R60.0]  Chest pain [R07.9]  Chest pain, unspecified type [R07.9]  PMHx/PSHx:     has no past medical history on file.    has no past surgical history on file.   Hx C4 fracture, intracranial hemorrhage       Procedure/Surgery:  no  Precautions:  Falls,  FWB (full weight bearing) Cervical spinal precautions and Cervical Collar, BLE DVT  Equipment Needs: To be determined,    SUBJECTIVE:    Patient lives with family   in a ranch home  with 2 steps to enter without Rail  Bed is on 1 floor and bath is on 1 floor.   Patient ambulated independently  PTA.   Admitted from rehab center [facility]  Equipment owned: None,      OBJECTIVE:   Initial Evaluation  Date: 24 Treatment Short Term/ Long Term   Goals   AM-PAC 6 Clicks      Was pt agreeable to Eval/treatment? Yes      Does pt have pain? Nne stated     Bed Mobility  Rolling: Min  Supine to sit:   Min   Sit to supine: Min   Scooting: Min   Cues to adhere to precautions.   Rolling: Ind  Supine to sit: Ind  Sit to supine: Ind  Scooting: Ind   Transfers Sit to stand: Mod  from low surface.   Stand to sit: Mod   Stand pivot: Mod    Sit to stand: Ind   Stand to sit: Ind   Stand pivot: Ind    Ambulation    20 feet x 2  then seated rest. 80 feet with Mod A due to decreased balance.   150+ feet with Mod Ind     Stair negotiation: ascended and descended  NT  2 steps with Min      Strength/ROM:   See OT note for BUE ROM and strength  RLE grossly 4/5  LLE grossly 4/5  RLE AROM WFL  LLE AROM WFL    Balance:     Static Sitting: SBA  Dynamic Sitting: 
Pulmonary consult sent to Dr Barr via PS  
Vascular consult sent to Dr Hinds via PS  
status recommend repeating stat CT brain.      DISPOSITION:     Medications:  REVIEWED DAILY    Infusion Medications    sodium chloride       Scheduled Medications    sodium chloride flush  5-40 mL IntraVENous 2 times per day    furosemide  20 mg Oral Daily    gabapentin  300 mg Oral TID    mirtazapine  15 mg Oral Nightly    pantoprazole  40 mg Oral QAM AC    QUEtiapine  12.5 mg Oral Nightly    Vitamin D  1,000 Units Oral Daily    sennosides-docusate sodium  1 tablet Oral Daily    tamsulosin  0.4 mg Oral Daily     PRN Meds: benzocaine-menthol, benzonatate, hydrALAZINE, melatonin, Polyvinyl Alcohol-Povidone PF, sodium chloride, sodium chloride flush, sodium chloride, potassium chloride **OR** potassium alternative oral replacement **OR** potassium chloride, magnesium sulfate, ondansetron **OR** ondansetron, polyethylene glycol, acetaminophen **OR** acetaminophen    Labs:     Recent Labs     04/16/24 0146 04/17/24  0554   WBC 10.9 7.7   HGB 10.5* 9.9*   HCT 33.2* 31.4*   * 472*       Recent Labs     04/16/24 0146 04/16/24  0911 04/17/24  0554     --  141   K 4.0  --  3.8     --  107   CO2 24  --  21*   BUN 17  --  15   CREATININE 0.9  --  0.9   CALCIUM 9.1  --  8.8   PHOS  --  3.5  --        Recent Labs     04/16/24 0146 04/17/24  0554   PROT 6.7 5.9*   ALKPHOS 295* 263*   * 218*   * 107*   BILITOT 0.9 0.5       Recent Labs     04/16/24 0146   INR 1.2       No results for input(s): \"CKTOTAL\", \"TROPONINI\" in the last 72 hours.    Chronic labs:    Lab Results   Component Value Date    CHOL 181 04/16/2024    TRIG 131 04/16/2024    HDL 34 (L) 04/16/2024    TSH 1.30 04/16/2024    INR 1.2 04/16/2024    LABA1C 5.0 04/16/2024       Radiology: REVIEWED DAILY    +++++++++++++++++++++++++++++++++++++++++++++++++  Vu Hernandez MD  Hospitalist  [facility], [place]  +++++++++++++++++++++++++++++++++++++++++++++++++  NOTE: This report was transcribed 
Primary    Cholelithiasis and echogenic debris within the gallbladder - Per Primary.      Mild Aortic regurgitation - Per Echo 3/27/24    Hx of ICH (intracerebral hemorrhage) - Seen by Neurosurgery - Neurology consulted. MRI head/brain done \"Large subacute hematomas in the medial inferior frontal lobes, larger on the left than the right, consistent with hemorrhagic contusions\".    s/p fall with C4 cervical fracture (HCC) - 3/19/24. 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 Pulmonary consulted.     Hx of DVT (deep venous thrombosis) ([facility]) - s/p IVC filter in 3/20/24     Edema feet - Continue diuretics, elevate feet, decrease salt.  Normal BNP.     Hx of Lumbar DDD - s/p laminectomy (L2, L3, L4, and L5 in 2009)          d/w Dr Amaya (imaging) - give fluids and Ivabradine - If BP better will call radiology for CCTA.      I recommended him to f/u with Cardiologist-Dr. Amauri Nguyễn with [facility], on 5-6-2024 as previously scheduled.     Test results and above recommendations discussed with him. No family at bed side.      Electronically signed by Vance Sharma MD on 4/19/2024 at 10:04 AM      Addendum:    After 500cc fluids, BP better - Continue rest of 500cc bolus and monitor BP.  Give PO Lopressor, Ivabradine not given yet    Vitals:    04/19/24 1130   BP: 113/72   Pulse: 80   Resp: 20   Temp:    SpO2: 94%       Above d/w him    Case d/w Primary- Dr Shell - If CCTA shows CAD, he will check with Neuro if he can take ASA 81mg.      Electronically signed by Vance Sharma MD on 4/19/2024 at 11:55 AM    
Vu Hernandez MD   1 tablet at 04/19/24 0924    tamsulosin (FLOMAX) capsule 0.4 mg  0.4 mg Oral Daily Vu Hernandez MD   0.4 mg at 04/19/24 0924       PRN Medications  nitroGLYCERIN, benzocaine-menthol, benzonatate, hydrALAZINE, melatonin, Polyvinyl Alcohol-Povidone PF, sodium chloride, sodium chloride flush, sodium chloride, potassium chloride **OR** potassium alternative oral replacement **OR** potassium chloride, magnesium sulfate, ondansetron **OR** ondansetron, polyethylene glycol, acetaminophen **OR** acetaminophen    Most Recent Labs  Lab Results   Component Value Date    WBC 9.0 04/19/2024    HGB 9.8 (L) 04/19/2024    HCT 31.3 (L) 04/19/2024     04/19/2024     04/19/2024    K 4.0 04/19/2024     04/19/2024    CREATININE 0.9 04/19/2024    BUN 13 04/19/2024    CO2 22 04/19/2024    GLUCOSE 97 04/19/2024     (H) 04/19/2024    AST 31 04/19/2024    INR 1.2 04/16/2024    APTT 25.4 03/27/2024    TSH 1.30 04/16/2024    LABA1C 5.0 04/16/2024       MRI BRAIN WO CONTRAST   Final Result   1. Large subacute hematomas in the medial inferior frontal lobes, larger on   the left than the right, consistent with hemorrhagic contusions. These do not   produce significant mass effect, with no effacement of the adjacent frontal   horns.   2. Normal appearance of the rest of the brain.   3. Moderate air-fluid level in the dominant left sphenoid sinus from acute   sinusitis.         MRA HEAD WO CONTRAST   Final Result   1. No sign of any vascular abnormality to correlate with the intraparenchymal   hemorrhages and surrounding edema of the medial inferior frontal lobes, left   greater than right. These are consistent with acute hemorrhagic contusions.   2. No significant stenosis or occlusion of the proximal intracranial arteries.         US ABDOMEN LIMITED Specify organ? LIVER, GALLBLADDER   Final Result   Cholelithiasis and echogenic debris within the gallbladder. No gallbladder   wall 
sennosides-docusate sodium (SENOKOT-S) 8.6-50 MG tablet 1 tablet  1 tablet Oral Daily Vu Hernandez MD   1 tablet at 04/21/24 1014    tamsulosin (FLOMAX) capsule 0.4 mg  0.4 mg Oral Daily Vu Hernandez MD   0.4 mg at 04/21/24 1014       PRN Medications  metoprolol, nitroGLYCERIN, benzocaine-menthol, benzonatate, hydrALAZINE, melatonin, Polyvinyl Alcohol-Povidone PF, sodium chloride, sodium chloride flush, sodium chloride, potassium chloride **OR** potassium alternative oral replacement **OR** potassium chloride, magnesium sulfate, ondansetron **OR** ondansetron, polyethylene glycol, acetaminophen **OR** acetaminophen    Most Recent Labs  Lab Results   Component Value Date    WBC 9.0 04/19/2024    HGB 9.8 (L) 04/19/2024    HCT 31.3 (L) 04/19/2024     04/19/2024     04/19/2024    K 4.0 04/19/2024     04/19/2024    CREATININE 0.9 04/19/2024    BUN 13 04/19/2024    CO2 22 04/19/2024    GLUCOSE 97 04/19/2024     (H) 04/19/2024    AST 31 04/19/2024    INR 1.2 04/16/2024    APTT 25.4 03/27/2024    TSH 1.30 04/16/2024    LABA1C 5.0 04/16/2024       CTA CORON EJECT FRAC WALL MOTION   Final Result   The left main coronary artery has approximately 50% stenosis with a calcified   plaque at  the distal bifurcation level.      The LAD has extensive calcified plaque in the proximal and mid segment with   significant blooming artifact and hemodynamically significant stenosis cannot   be ruled out.      The LCx has extensive calcified plaque with 30 to 40% stenosis in the mid   vessel and hemodynamically significant stenosis cannot be ruled out in the   distal vessel.  OM1 has a calcified plaque with significant blooming artifact   and hemodynamically      significant stenosis cannot be ruled out.      The RCA has 20% stenosis in the proximal segment followed by 50 to 60%   stenosis, mid segment has 30 to 40% stenosis in the distal vessel has 10 to   20% stenosis.      Normal LV size    
Units at 04/18/24 0904    sennosides-docusate sodium (SENOKOT-S) 8.6-50 MG tablet 1 tablet  1 tablet Oral Daily Vu Hernandez MD   1 tablet at 04/18/24 0904    tamsulosin (FLOMAX) capsule 0.4 mg  0.4 mg Oral Daily Vu Hernandez MD   0.4 mg at 04/18/24 0904       PRN Medications  nitroGLYCERIN, benzocaine-menthol, benzonatate, hydrALAZINE, melatonin, Polyvinyl Alcohol-Povidone PF, sodium chloride, sodium chloride flush, sodium chloride, potassium chloride **OR** potassium alternative oral replacement **OR** potassium chloride, magnesium sulfate, ondansetron **OR** ondansetron, polyethylene glycol, acetaminophen **OR** acetaminophen    +++++++++++++++++++++++++++++++++++++++++++++++++  Martin Shell MD    [place], [place]  +++++++++++++++++++++++++++++++++++++++++++++++++  NOTE: This report was transcribed using voice recognition software. Every effort was made to ensure accuracy; however, inadvertent computerized transcription errors may be present.    I can be reached through [employer].    
0.4 mg  0.4 mg Oral Daily Vu Hernandez MD   0.4 mg at 04/20/24 0927       PRN Medications  metoprolol, nitroGLYCERIN, benzocaine-menthol, benzonatate, hydrALAZINE, melatonin, Polyvinyl Alcohol-Povidone PF, sodium chloride, sodium chloride flush, sodium chloride, potassium chloride **OR** potassium alternative oral replacement **OR** potassium chloride, magnesium sulfate, ondansetron **OR** ondansetron, polyethylene glycol, acetaminophen **OR** acetaminophen    +++++++++++++++++++++++++++++++++++++++++++++++++  Martin Shell MD    [place], [place]  +++++++++++++++++++++++++++++++++++++++++++++++++  NOTE: This report was transcribed using voice recognition software. Every effort was made to ensure accuracy; however, inadvertent computerized transcription errors may be present.    I can be reached through [employer].

## 2024-04-26 ENCOUNTER — TELEPHONE (OUTPATIENT)
Dept: PULMONOLOGY | Age: 69
End: 2024-04-26

## 2024-04-26 DIAGNOSIS — I82.403 DEEP VEIN THROMBOSIS (DVT) OF BOTH LOWER EXTREMITIES, UNSPECIFIED CHRONICITY, UNSPECIFIED VEIN (HCC): ICD-10-CM

## 2024-04-26 DIAGNOSIS — Z86.711 HISTORY OF PULMONARY EMBOLISM: Primary | ICD-10-CM

## 2024-04-26 DIAGNOSIS — S06.36AA TRAUMATIC INTRACEREBRAL HEMORRHAGE WITH UNKNOWN LOSS OF CONSCIOUSNESS STATUS, UNSPECIFIED LATERALITY, INITIAL ENCOUNTER (HCC): Primary | ICD-10-CM

## 2024-04-26 NOTE — TELEPHONE ENCOUNTER
Facility calling into office for Hospital follow up; pt seen by Dr Harden in hospital and will need 3 mos follow up as out patient with testing to be done. Office to mail out testing appt and follow up office appt to pt's home as well as Facility to ensure pt follow up.

## 2024-04-26 NOTE — TELEPHONE ENCOUNTER
Mailed a letter to patient informing him that his Lung Vent is scheduled for 7-8-24 at 10:00 am at the [facility]. He must arrive by 9:30 am. There is no prep for this test.    His CTA Pulmonary is scheduled for 7-28-24 at 11:00 am directly after his Lung Vent. He is to have nothing to eat or drink for 3 hours prior to test

## 2024-05-14 ENCOUNTER — HOSPITAL ENCOUNTER (OUTPATIENT)
Dept: CT IMAGING | Age: 69
Discharge: HOME | End: 2024-05-16
Attending: NEUROLOGICAL SURGERY
Payer: MEDICARE

## 2024-05-14 ENCOUNTER — OFFICE VISIT (OUTPATIENT)
Dept: NEUROSURGERY | Age: 69
End: 2024-05-14
Payer: MEDICARE

## 2024-05-14 VITALS — HEIGHT: 73 IN | BODY MASS INDEX: 30.22 KG/M2 | RESPIRATION RATE: 16 BRPM | WEIGHT: 228 LBS

## 2024-05-14 DIAGNOSIS — S06.36AA TRAUMATIC INTRACEREBRAL HEMORRHAGE WITH UNKNOWN LOSS OF CONSCIOUSNESS STATUS, UNSPECIFIED LATERALITY, INITIAL ENCOUNTER (HCC): ICD-10-CM

## 2024-05-14 DIAGNOSIS — S12.300K: Primary | ICD-10-CM

## 2024-05-14 PROCEDURE — 99213 OFFICE O/P EST LOW 20 MIN: CPT | Performed by: PHYSICIAN ASSISTANT

## 2024-05-14 PROCEDURE — 70450 CT HEAD/BRAIN W/O DYE: CPT

## 2024-05-14 PROCEDURE — 1123F ACP DISCUSS/DSCN MKR DOCD: CPT | Performed by: PHYSICIAN ASSISTANT

## 2024-05-14 RX ORDER — MELOXICAM 15 MG/1
TABLET ORAL
COMMUNITY
Start: 2021-10-12

## 2024-05-14 RX ORDER — QUETIAPINE FUMARATE 25 MG/1
TABLET, FILM COATED ORAL
COMMUNITY
Start: 2024-03-25

## 2024-05-14 RX ORDER — OXYCODONE HYDROCHLORIDE 5 MG/1
TABLET ORAL
COMMUNITY
Start: 2024-03-25

## 2024-05-14 NOTE — PROGRESS NOTES
Patient is here for follow up from a hospital consult for: ICH and C4 fracture.  He denies headaches or neck pain.    Physical exam  Alert and Oriented X3  PERRLA, EOMI  PEREZ 5/5  Sensation intact to LT and PP  Reflexes are 2+ and symmetric    A/P: patient is here for follow up for: ICH and C4 fracture.  Head CT shows resolution of hemorrhage.  It is ok for AC and AP use if needed.  He will continue with the collar and restrictions.  F/u in 4 wks with cervical x-rays.

## 2024-05-28 ENCOUNTER — OFFICE VISIT (OUTPATIENT)
Dept: VASCULAR SURGERY | Age: 69
End: 2024-05-28
Payer: MEDICARE

## 2024-05-28 DIAGNOSIS — I82.423 ACUTE DEEP VEIN THROMBOSIS (DVT) OF BOTH ILIOFEMORAL VEINS (HCC): Primary | ICD-10-CM

## 2024-05-28 PROCEDURE — 99213 OFFICE O/P EST LOW 20 MIN: CPT | Performed by: SURGERY

## 2024-05-28 PROCEDURE — 1123F ACP DISCUSS/DSCN MKR DOCD: CPT | Performed by: SURGERY

## 2024-05-28 RX ORDER — M-VIT,TX,IRON,MINS/CALC/FOLIC 27MG-0.4MG
1 TABLET ORAL DAILY
COMMUNITY

## 2024-05-28 NOTE — PROGRESS NOTES
Housing Stability: Not on file        No family history on file.    REVIEW OF SYSTEMS (New symptoms):    Eyes:      Blurred vision:  No [x]/Yes []               Diplopia:   No [x]/Yes []               Vision loss:       No [x]/Yes []   Ears, nose, throat:             Hearing loss:    No [x]/Yes []      Vertigo:   No [x]/Yes []                       Swallowing problem:  No [x]/Yes []               Nose bleeds:   No [x]/Yes []      Voice hoarseness:  No [x]/Yes []  Respiratory:             Cough:   No [x]/Yes []      Pleuritic chest pain:  No [x]/Yes []                        Dyspnea:   No [x]/Yes []      Wheezing:   No [x]/Yes []  Cardiovascular:             Angina:   No [x]/Yes []      Palpitations:   No [x]/Yes []          Claudication:    No [x]/Yes []      Leg swelling:   No []/Yes [x]  Gastrointestinal:             Nausea or vomiting:  No [x]/Yes []               Abdominal pain:  No [x]/Yes []                     Intestinal bleeding: No [x]/Yes []  Musculoskeletal:             Leg pain:   No []/Yes [x]      Back pain:   No [x]/Yes []                    Weakness:   No [x]/Yes []  Neurologic:             Numbness:   No [x]/Yes []      Paralysis:   No [x]/Yes []                       Headaches:   No [x]/Yes []  Hematologic, lymphatic:   Anemia:   No [x]/Yes []              Bleeding or bruising:  No [x]/Yes []              Fevers or chills: No [x]/Yes []  Endocrine:             Temp intolerance:   No [x]/Yes []                       Polydipsia, polyuria:  No [x]/Yes []  Skin:              Rash:    No [x]/Yes []      Ulcers:   No [x]/Yes []              Abnorm pigment: No [x]/Yes []  :              Frequency/urgency:  No [x]/Yes []      Hematuria:    No [x]/Yes []                      Incontinence:    No [x]/Yes []    PHYSICAL EXAM:  There were no vitals filed for this visit.  General Appearance: alert and oriented to person, place and time, in no acute distress, well developed and well- nourished  Neurologic: no

## 2024-06-18 ENCOUNTER — OFFICE VISIT (OUTPATIENT)
Dept: NEUROSURGERY | Age: 69
End: 2024-06-18
Payer: MEDICARE

## 2024-06-18 ENCOUNTER — HOSPITAL ENCOUNTER (OUTPATIENT)
Age: 69
Discharge: HOME | End: 2024-06-20
Payer: MEDICARE

## 2024-06-18 ENCOUNTER — HOSPITAL ENCOUNTER (OUTPATIENT)
Dept: GENERAL RADIOLOGY | Age: 69
Discharge: HOME | End: 2024-06-20
Payer: MEDICARE

## 2024-06-18 DIAGNOSIS — S12.300K: ICD-10-CM

## 2024-06-18 DIAGNOSIS — S12.300D CLOSED DISPLACED FRACTURE OF FOURTH CERVICAL VERTEBRA WITH ROUTINE HEALING, UNSPECIFIED FRACTURE MORPHOLOGY, SUBSEQUENT ENCOUNTER: Primary | ICD-10-CM

## 2024-06-18 PROCEDURE — 1123F ACP DISCUSS/DSCN MKR DOCD: CPT | Performed by: PHYSICIAN ASSISTANT

## 2024-06-18 PROCEDURE — 99213 OFFICE O/P EST LOW 20 MIN: CPT | Performed by: PHYSICIAN ASSISTANT

## 2024-06-18 PROCEDURE — 72040 X-RAY EXAM NECK SPINE 2-3 VW: CPT

## 2024-06-18 NOTE — PROGRESS NOTES
Patient is here for follow up from a hospital consult for: C4 fracture.  He denies neck pain.    Physical exam  Alert and Oriented X3  PERRLA, EOMI  PEREZ 5/5  Sensation intact to LT and PP  Reflexes are 2+ and symmetric  A/P:   C4 fracture. .  Discontinue collar.  No restrictions.  F/u  PRN.

## 2024-07-05 DIAGNOSIS — Z86.711 HISTORY OF PULMONARY EMBOLISM: Primary | ICD-10-CM

## 2024-07-08 ENCOUNTER — HOSPITAL ENCOUNTER (OUTPATIENT)
Age: 69
Discharge: HOME | End: 2024-07-08
Payer: MEDICARE

## 2024-07-08 ENCOUNTER — HOSPITAL ENCOUNTER (OUTPATIENT)
Dept: CT IMAGING | Age: 69
Discharge: HOME | End: 2024-07-10
Attending: INTERNAL MEDICINE
Payer: MEDICARE

## 2024-07-08 ENCOUNTER — HOSPITAL ENCOUNTER (OUTPATIENT)
Dept: NUCLEAR MEDICINE | Age: 69
Discharge: HOME | End: 2024-07-10
Attending: INTERNAL MEDICINE
Payer: MEDICARE

## 2024-07-08 ENCOUNTER — HOSPITAL ENCOUNTER (OUTPATIENT)
Dept: GENERAL RADIOLOGY | Age: 69
Discharge: HOME | End: 2024-07-10
Attending: INTERNAL MEDICINE
Payer: MEDICARE

## 2024-07-08 DIAGNOSIS — Z86.711 HISTORY OF PULMONARY EMBOLISM: ICD-10-CM

## 2024-07-08 DIAGNOSIS — I82.403 DEEP VEIN THROMBOSIS (DVT) OF BOTH LOWER EXTREMITIES, UNSPECIFIED CHRONICITY, UNSPECIFIED VEIN (HCC): ICD-10-CM

## 2024-07-08 LAB
ALBUMIN SERPL-MCNC: 4.5 G/DL (ref 3.5–5.2)
ALP SERPL-CCNC: 81 U/L (ref 40–129)
ALT SERPL-CCNC: 69 U/L (ref 0–40)
ANION GAP SERPL CALCULATED.3IONS-SCNC: 11 MMOL/L (ref 7–16)
AST SERPL-CCNC: 46 U/L (ref 0–39)
BILIRUB SERPL-MCNC: 0.5 MG/DL (ref 0–1.2)
BUN SERPL-MCNC: 16 MG/DL (ref 6–23)
CALCIUM SERPL-MCNC: 9.4 MG/DL (ref 8.6–10.2)
CHLORIDE SERPL-SCNC: 106 MMOL/L (ref 98–107)
CO2 SERPL-SCNC: 26 MMOL/L (ref 22–29)
CREAT SERPL-MCNC: 1.2 MG/DL (ref 0.7–1.2)
GFR, ESTIMATED: 65 ML/MIN/1.73M2
GLUCOSE SERPL-MCNC: 105 MG/DL (ref 74–99)
POTASSIUM SERPL-SCNC: 5.1 MMOL/L (ref 3.5–5)
PROT SERPL-MCNC: 7.4 G/DL (ref 6.4–8.3)
SODIUM SERPL-SCNC: 143 MMOL/L (ref 132–146)

## 2024-07-08 PROCEDURE — 6360000004 HC RX CONTRAST MEDICATION: Performed by: RADIOLOGY

## 2024-07-08 PROCEDURE — A9539 TC99M PENTETATE: HCPCS | Performed by: RADIOLOGY

## 2024-07-08 PROCEDURE — 3430000000 HC RX DIAGNOSTIC RADIOPHARMACEUTICAL: Performed by: RADIOLOGY

## 2024-07-08 PROCEDURE — 71046 X-RAY EXAM CHEST 2 VIEWS: CPT

## 2024-07-08 PROCEDURE — 36415 COLL VENOUS BLD VENIPUNCTURE: CPT

## 2024-07-08 PROCEDURE — 80053 COMPREHEN METABOLIC PANEL: CPT

## 2024-07-08 PROCEDURE — 78582 LUNG VENTILAT&PERFUS IMAGING: CPT

## 2024-07-08 PROCEDURE — 2580000003 HC RX 258: Performed by: RADIOLOGY

## 2024-07-08 PROCEDURE — 71275 CT ANGIOGRAPHY CHEST: CPT

## 2024-07-08 RX ORDER — KIT FOR THE PREPARATION OF TECHNETIUM TC 99M PENTETATE 20 MG/1
34.9 INJECTION, POWDER, LYOPHILIZED, FOR SOLUTION INTRAVENOUS; RESPIRATORY (INHALATION)
Status: COMPLETED | OUTPATIENT
Start: 2024-07-08 | End: 2024-07-08

## 2024-07-08 RX ORDER — SODIUM CHLORIDE 0.9 % (FLUSH) 0.9 %
10 SYRINGE (ML) INJECTION
Status: COMPLETED | OUTPATIENT
Start: 2024-07-08 | End: 2024-07-08

## 2024-07-08 RX ADMIN — Medication 10 ML: at 11:30

## 2024-07-08 RX ADMIN — IOPAMIDOL 70 ML: 755 INJECTION, SOLUTION INTRAVENOUS at 11:29

## 2024-07-08 RX ADMIN — KIT FOR THE PREPARATION OF TECHNETIUM TC 99M PENTETATE 34.9 MILLICURIE: 20 INJECTION, POWDER, LYOPHILIZED, FOR SOLUTION INTRAVENOUS; RESPIRATORY (INHALATION) at 10:21

## 2024-07-17 ENCOUNTER — HOSPITAL ENCOUNTER (OUTPATIENT)
Dept: ULTRASOUND IMAGING | Age: 69
Discharge: HOME | End: 2024-07-19
Attending: INTERNAL MEDICINE
Payer: MEDICARE

## 2024-07-17 DIAGNOSIS — I82.523: ICD-10-CM

## 2024-07-17 PROCEDURE — 93970 EXTREMITY STUDY: CPT

## 2024-08-16 ENCOUNTER — TELEPHONE (OUTPATIENT)
Dept: CARDIOLOGY | Age: 69
End: 2024-08-16

## 2024-08-16 NOTE — TELEPHONE ENCOUNTER
CALLED PATIENT AND LEFT MESSAGE TO SCHEDULE ECHO.    Electronically signed by Kelle Castrejon on 8/16/2024 at 9:14 AM

## 2024-09-10 ENCOUNTER — HOSPITAL ENCOUNTER (OUTPATIENT)
Dept: CARDIOLOGY | Age: 69
Discharge: HOME | End: 2024-09-12
Attending: INTERNAL MEDICINE
Payer: MEDICARE

## 2024-09-10 VITALS
HEIGHT: 73 IN | SYSTOLIC BLOOD PRESSURE: 106 MMHG | WEIGHT: 225 LBS | DIASTOLIC BLOOD PRESSURE: 73 MMHG | BODY MASS INDEX: 29.82 KG/M2

## 2024-09-10 DIAGNOSIS — Z86.711 HISTORY OF PULMONARY EMBOLISM: ICD-10-CM

## 2024-09-10 DIAGNOSIS — I82.403 DEEP VEIN THROMBOSIS (DVT) OF BOTH LOWER EXTREMITIES, UNSPECIFIED CHRONICITY, UNSPECIFIED VEIN (HCC): ICD-10-CM

## 2024-09-10 PROCEDURE — 93306 TTE W/DOPPLER COMPLETE: CPT

## 2024-09-11 LAB
ECHO AR MAX VEL PISA: 3.5 M/S
ECHO AV AREA PEAK VELOCITY: 3.1 CM2
ECHO AV AREA VTI: 4 CM2
ECHO AV AREA/BSA PEAK VELOCITY: 1.4 CM2/M2
ECHO AV AREA/BSA VTI: 1.8 CM2/M2
ECHO AV CUSP MM: 2.2 CM
ECHO AV MEAN GRADIENT: 3 MMHG
ECHO AV MEAN VELOCITY: 0.7 M/S
ECHO AV PEAK GRADIENT: 7 MMHG
ECHO AV PEAK VELOCITY: 1.3 M/S
ECHO AV REGURGITANT PHT: 671.4 MILLISECOND
ECHO AV VELOCITY RATIO: 0.77
ECHO AV VTI: 20.6 CM
ECHO BSA: 2.29 M2
ECHO LA DIAMETER INDEX: 1.59 CM/M2
ECHO LA DIAMETER: 3.6 CM
ECHO LA VOL A-L A2C: 41 ML (ref 18–58)
ECHO LA VOL A-L A4C: 35 ML (ref 18–58)
ECHO LA VOL MOD A2C: 39 ML (ref 18–58)
ECHO LA VOL MOD A4C: 34 ML (ref 18–58)
ECHO LA VOLUME AREA LENGTH: 42 ML
ECHO LA VOLUME INDEX A-L A2C: 18 ML/M2 (ref 16–34)
ECHO LA VOLUME INDEX A-L A4C: 15 ML/M2 (ref 16–34)
ECHO LA VOLUME INDEX AREA LENGTH: 19 ML/M2 (ref 16–34)
ECHO LA VOLUME INDEX MOD A2C: 17 ML/M2 (ref 16–34)
ECHO LA VOLUME INDEX MOD A4C: 15 ML/M2 (ref 16–34)
ECHO LV EF PHYSICIAN: 70 %
ECHO LV FRACTIONAL SHORTENING: 40 % (ref 28–44)
ECHO LV INTERNAL DIMENSION DIASTOLE INDEX: 2.35 CM/M2
ECHO LV INTERNAL DIMENSION DIASTOLIC: 5.3 CM (ref 4.2–5.9)
ECHO LV INTERNAL DIMENSION SYSTOLIC INDEX: 1.42 CM/M2
ECHO LV INTERNAL DIMENSION SYSTOLIC: 3.2 CM
ECHO LV IVSD: 1.2 CM (ref 0.6–1)
ECHO LV MASS 2D: 256.6 G (ref 88–224)
ECHO LV MASS INDEX 2D: 113.5 G/M2 (ref 49–115)
ECHO LV POSTERIOR WALL DIASTOLIC: 1.2 CM (ref 0.6–1)
ECHO LV RELATIVE WALL THICKNESS RATIO: 0.45
ECHO LVOT AREA: 4.2 CM2
ECHO LVOT AV VTI INDEX: 0.97
ECHO LVOT DIAM: 2.3 CM
ECHO LVOT MEAN GRADIENT: 2 MMHG
ECHO LVOT PEAK GRADIENT: 4 MMHG
ECHO LVOT PEAK VELOCITY: 1 M/S
ECHO LVOT STROKE VOLUME INDEX: 36.7 ML/M2
ECHO LVOT SV: 83.1 ML
ECHO LVOT VTI: 20 CM
ECHO MV A VELOCITY: 0.71 M/S
ECHO MV AREA PHT: 4.1 CM2
ECHO MV AREA VTI: 4.5 CM2
ECHO MV E DECELERATION TIME (DT): 230.8 MS
ECHO MV E VELOCITY: 0.49 M/S
ECHO MV E/A RATIO: 0.69
ECHO MV LVOT VTI INDEX: 0.93
ECHO MV MAX VELOCITY: 0.7 M/S
ECHO MV MEAN GRADIENT: 1 MMHG
ECHO MV MEAN VELOCITY: 0.3 M/S
ECHO MV PEAK GRADIENT: 2 MMHG
ECHO MV PRESSURE HALF TIME (PHT): 53.6 MS
ECHO MV VTI: 18.5 CM
ECHO PV MAX VELOCITY: 1 M/S
ECHO PV MEAN GRADIENT: 2 MMHG
ECHO PV MEAN VELOCITY: 0.6 M/S
ECHO PV PEAK GRADIENT: 4 MMHG
ECHO PV VTI: 15.7 CM
ECHO RV INTERNAL DIMENSION: 1.9 CM

## 2024-09-11 PROCEDURE — 93306 TTE W/DOPPLER COMPLETE: CPT | Performed by: INTERNAL MEDICINE

## 2024-09-17 ENCOUNTER — OFFICE VISIT (OUTPATIENT)
Dept: PULMONOLOGY | Age: 69
End: 2024-09-17
Payer: MEDICARE

## 2024-09-17 VITALS
RESPIRATION RATE: 20 BRPM | HEART RATE: 67 BPM | TEMPERATURE: 97.3 F | DIASTOLIC BLOOD PRESSURE: 71 MMHG | SYSTOLIC BLOOD PRESSURE: 107 MMHG | OXYGEN SATURATION: 96 %

## 2024-09-17 DIAGNOSIS — I82.5Z2 CHRONIC DEEP VEIN THROMBOSIS (DVT) OF DISTAL VEIN OF LEFT LOWER EXTREMITY (HCC): Primary | ICD-10-CM

## 2024-09-17 PROCEDURE — 99215 OFFICE O/P EST HI 40 MIN: CPT | Performed by: INTERNAL MEDICINE

## 2024-09-17 PROCEDURE — 1123F ACP DISCUSS/DSCN MKR DOCD: CPT | Performed by: INTERNAL MEDICINE

## 2024-09-17 RX ORDER — AZELASTINE HYDROCHLORIDE, FLUTICASONE PROPIONATE 137; 50 UG/1; UG/1
1 SPRAY, METERED NASAL
Qty: 1 EACH | Refills: 3 | Status: SHIPPED | OUTPATIENT
Start: 2024-09-17

## (undated) DEVICE — PAD, DEFIB, ADULT, RADIOTRAN, PHYSIO, LO: Brand: MEDLINE

## (undated) DEVICE — SHEATH INTRO 4FR L11CM GWIRE 0.035IN SIL TRICSP VLV SMOOTH

## (undated) DEVICE — CATHETER 4FR CORDIS JL4 100CM

## (undated) DEVICE — PACK SURG CARDIAC CATH

## (undated) DEVICE — CANNULA NSL CANN NSL L25FT TBNG AD O2 SUP SFT UC

## (undated) DEVICE — DRESSING HEMSTAT IMPREG KAOLIN SFT NONWOVEN INTVENT AND DIAG

## (undated) DEVICE — GUIDEWIRE VASC L145CM 0.035IN J TIP L3MM PTFE FIX COR NAMIC

## (undated) DEVICE — CATHETER DIAG 4FR 110CM 155DEG 0.038IN 9MM MIC LOOP VASC

## (undated) DEVICE — NEEDLE ANGIO 1 WALL STYL 18 GAX70 CM THN ADV

## (undated) DEVICE — CATHETER DIAG AD 4FR L100CM STD NYL JUDKINS R 4 TRULUMEN

## (undated) DEVICE — CATHETER 4FR JL5 CORDIS 100CM

## (undated) DEVICE — KIT ANGIO W/ AT P65 PREM HND CTRL FOR CNTRST DEL ANGIOTOUCH